# Patient Record
Sex: FEMALE | Race: WHITE
[De-identification: names, ages, dates, MRNs, and addresses within clinical notes are randomized per-mention and may not be internally consistent; named-entity substitution may affect disease eponyms.]

---

## 2018-06-14 ENCOUNTER — HOSPITAL ENCOUNTER (OUTPATIENT)
Dept: HOSPITAL 89 - AMB | Age: 73
End: 2018-06-14
Payer: MEDICARE

## 2018-06-14 ENCOUNTER — HOSPITAL ENCOUNTER (INPATIENT)
Dept: HOSPITAL 89 - ER | Age: 73
LOS: 3 days | Discharge: HOME | DRG: 872 | End: 2018-06-17
Attending: UROLOGY | Admitting: UROLOGY
Payer: MEDICARE

## 2018-06-14 VITALS — SYSTOLIC BLOOD PRESSURE: 162 MMHG | DIASTOLIC BLOOD PRESSURE: 78 MMHG

## 2018-06-14 VITALS — SYSTOLIC BLOOD PRESSURE: 161 MMHG | DIASTOLIC BLOOD PRESSURE: 92 MMHG

## 2018-06-14 VITALS — SYSTOLIC BLOOD PRESSURE: 148 MMHG | DIASTOLIC BLOOD PRESSURE: 80 MMHG

## 2018-06-14 VITALS — SYSTOLIC BLOOD PRESSURE: 157 MMHG | DIASTOLIC BLOOD PRESSURE: 84 MMHG

## 2018-06-14 VITALS — DIASTOLIC BLOOD PRESSURE: 89 MMHG | SYSTOLIC BLOOD PRESSURE: 159 MMHG

## 2018-06-14 VITALS — SYSTOLIC BLOOD PRESSURE: 151 MMHG | DIASTOLIC BLOOD PRESSURE: 83 MMHG

## 2018-06-14 VITALS — DIASTOLIC BLOOD PRESSURE: 96 MMHG | SYSTOLIC BLOOD PRESSURE: 163 MMHG

## 2018-06-14 VITALS — DIASTOLIC BLOOD PRESSURE: 100 MMHG | SYSTOLIC BLOOD PRESSURE: 173 MMHG

## 2018-06-14 VITALS — DIASTOLIC BLOOD PRESSURE: 72 MMHG | SYSTOLIC BLOOD PRESSURE: 138 MMHG

## 2018-06-14 VITALS — SYSTOLIC BLOOD PRESSURE: 156 MMHG | DIASTOLIC BLOOD PRESSURE: 85 MMHG

## 2018-06-14 VITALS — SYSTOLIC BLOOD PRESSURE: 157 MMHG | DIASTOLIC BLOOD PRESSURE: 95 MMHG

## 2018-06-14 VITALS — DIASTOLIC BLOOD PRESSURE: 87 MMHG | SYSTOLIC BLOOD PRESSURE: 162 MMHG

## 2018-06-14 DIAGNOSIS — N30.01: ICD-10-CM

## 2018-06-14 DIAGNOSIS — B96.4: ICD-10-CM

## 2018-06-14 DIAGNOSIS — N18.3: ICD-10-CM

## 2018-06-14 DIAGNOSIS — M25.559: ICD-10-CM

## 2018-06-14 DIAGNOSIS — F41.8: ICD-10-CM

## 2018-06-14 DIAGNOSIS — R10.84: Primary | ICD-10-CM

## 2018-06-14 DIAGNOSIS — A41.59: Primary | ICD-10-CM

## 2018-06-14 DIAGNOSIS — I12.9: ICD-10-CM
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
## 2018-06-14 DIAGNOSIS — N13.6: ICD-10-CM

## 2018-06-14 DIAGNOSIS — Z90.49: ICD-10-CM

## 2018-06-14 DIAGNOSIS — J45.909: ICD-10-CM

## 2018-06-14 LAB
PLATELET COUNT, AUTOMATED: 178 K/UL (ref 150–450)
PLATELET COUNT, AUTOMATED: 201 K/UL (ref 150–450)

## 2018-06-14 PROCEDURE — 87088 URINE BACTERIA CULTURE: CPT

## 2018-06-14 PROCEDURE — 84295 ASSAY OF SERUM SODIUM: CPT

## 2018-06-14 PROCEDURE — 84450 TRANSFERASE (AST) (SGOT): CPT

## 2018-06-14 PROCEDURE — 82565 ASSAY OF CREATININE: CPT

## 2018-06-14 PROCEDURE — 71045 X-RAY EXAM CHEST 1 VIEW: CPT

## 2018-06-14 PROCEDURE — 82040 ASSAY OF SERUM ALBUMIN: CPT

## 2018-06-14 PROCEDURE — 82247 BILIRUBIN TOTAL: CPT

## 2018-06-14 PROCEDURE — 84520 ASSAY OF UREA NITROGEN: CPT

## 2018-06-14 PROCEDURE — 84132 ASSAY OF SERUM POTASSIUM: CPT

## 2018-06-14 PROCEDURE — 3E1K88Z IRRIGATION OF GENITOURINARY TRACT USING IRRIGATING SUBSTANCE, VIA NATURAL OR ARTIFICIAL OPENING ENDOSCOPIC: ICD-10-PCS | Performed by: UROLOGY

## 2018-06-14 PROCEDURE — 82310 ASSAY OF CALCIUM: CPT

## 2018-06-14 PROCEDURE — 87186 SC STD MICRODIL/AGAR DIL: CPT

## 2018-06-14 PROCEDURE — 87040 BLOOD CULTURE FOR BACTERIA: CPT

## 2018-06-14 PROCEDURE — 36415 COLL VENOUS BLD VENIPUNCTURE: CPT

## 2018-06-14 PROCEDURE — 87077 CULTURE AEROBIC IDENTIFY: CPT

## 2018-06-14 PROCEDURE — 96367 TX/PROPH/DG ADDL SEQ IV INF: CPT

## 2018-06-14 PROCEDURE — 0T768DZ DILATION OF RIGHT URETER WITH INTRALUMINAL DEVICE, VIA NATURAL OR ARTIFICIAL OPENING ENDOSCOPIC: ICD-10-PCS | Performed by: UROLOGY

## 2018-06-14 PROCEDURE — BT1DZZZ FLUOROSCOPY OF RIGHT KIDNEY, URETER AND BLADDER: ICD-10-PCS | Performed by: UROLOGY

## 2018-06-14 PROCEDURE — 74420 UROGRAPHY RTRGR +-KUB: CPT

## 2018-06-14 PROCEDURE — 96365 THER/PROPH/DIAG IV INF INIT: CPT

## 2018-06-14 PROCEDURE — 99285 EMERGENCY DEPT VISIT HI MDM: CPT

## 2018-06-14 PROCEDURE — 74176 CT ABD & PELVIS W/O CONTRAST: CPT

## 2018-06-14 PROCEDURE — 82374 ASSAY BLOOD CARBON DIOXIDE: CPT

## 2018-06-14 PROCEDURE — 83605 ASSAY OF LACTIC ACID: CPT

## 2018-06-14 PROCEDURE — 96361 HYDRATE IV INFUSION ADD-ON: CPT

## 2018-06-14 PROCEDURE — 84075 ASSAY ALKALINE PHOSPHATASE: CPT

## 2018-06-14 PROCEDURE — 85025 COMPLETE CBC W/AUTO DIFF WBC: CPT

## 2018-06-14 PROCEDURE — 84155 ASSAY OF PROTEIN SERUM: CPT

## 2018-06-14 PROCEDURE — 84460 ALANINE AMINO (ALT) (SGPT): CPT

## 2018-06-14 PROCEDURE — 81001 URINALYSIS AUTO W/SCOPE: CPT

## 2018-06-14 PROCEDURE — 82947 ASSAY GLUCOSE BLOOD QUANT: CPT

## 2018-06-14 PROCEDURE — 96375 TX/PRO/DX INJ NEW DRUG ADDON: CPT

## 2018-06-14 PROCEDURE — 93005 ELECTROCARDIOGRAM TRACING: CPT

## 2018-06-14 PROCEDURE — 83690 ASSAY OF LIPASE: CPT

## 2018-06-14 PROCEDURE — 82435 ASSAY OF BLOOD CHLORIDE: CPT

## 2018-06-14 PROCEDURE — 96368 THER/DIAG CONCURRENT INF: CPT

## 2018-06-14 RX ADMIN — SODIUM CHLORIDE SCH MLS/HR: 900 INJECTION, SOLUTION INTRAVENOUS at 19:42

## 2018-06-14 RX ADMIN — METOPROLOL TARTRATE SCH MG: 50 TABLET, FILM COATED ORAL at 21:42

## 2018-06-14 RX ADMIN — DOCUSATE SODIUM SCH MG: 100 CAPSULE, LIQUID FILLED ORAL at 21:41

## 2018-06-14 NOTE — RADIOLOGY IMAGING REPORT
FACILITY: [facility] 

 

PATIENT NAME: Mila Maria

: 1945

MR: [id number]

V: [id number]

EXAM DATE: 

ORDERING PHYSICIAN: WILLIAM MELLO

TECHNOLOGIST: 

 

Location: [facility] [place]

Patient: Mila Maria

: 1945

MRN: [id number]

Visit/Account:[id number]

Date of Sevice:  2018

 

ACCESSION #: [id number]

 

Right knee

 

Indication: Pain

 

Comparison: None available

 

Findings:

3 views right knee were obtained.

Degenerative narrowing seen in the medial compartment with mild osteophytic change. There is severe n
arrowing in the lateral compartment with moderate marginal osteophytic change. Osseous spurring is se
en along the intercondylar notch and involving the tibial spines. Lateral view demonstrates narrowing
 and spurring the patellofemoral joint. Enthesopathy is seen at the insertion the quadriceps tendon o
n the superior patella.

No joint effusion. No acute fracture.

 

 

IMPRESSION:

1. Moderate to severe tricompartmental degenerative change. No acute finding.

 

Report Dictated By: Boone Barr MD at 2018 7:34 AM

 

Report E-Signed By: Boone Barr MD  at 2018 7:35 AM

 

WSN:[id number]

## 2018-06-14 NOTE — RADIOLOGY IMAGING REPORT
FACILITY: [facility] 

 

PATIENT NAME: Mila Maria

: 1945

MR: [id number]

V: [id number]

EXAM DATE: 

ORDERING PHYSICIAN: KANIKA VARGHESE

TECHNOLOGIST: 

 

Location: [facility]

Patient: Mila Maria

: 1945

MRN: [id number]

Visit/Account:[id number]

Date of Sevice:  2018

 

ACCESSION #: [id number]

 

Exam type: RETROGRADE PYELOGRAM

 

History: POSSIBLE KIDNEY STONES

 

Comparison: CT abdomen and pelvis performed today.

 

Findings:

 

16 intraoperative C-arm spot views of the abdomen and pelvis were submitted for interpretation.  The 
total fluoroscopy time was 0.53 minutes.  The fluoroscopy dose was 1.02 mGray.  Contrast is identifie
d in the moderately dilated right renal pelvis.  Final images demonstrate a right ureteral stent in p
lace.  Partial staghorn calculus noted on today's CT scan is seen projecting over the left renal shad
ow.

 

IMPRESSION:

 

1.  As above

 

Report Dictated By: Hedy Anderson MD at 2018 1:38 PM

 

Report E-Signed By: Hedy Anderson MD  at 2018 1:40 PM

 

WSN:[id number]

## 2018-06-14 NOTE — RADIOLOGY IMAGING REPORT
FACILITY: [facility] 

 

PATIENT NAME: Mila Maria

: 1945

MR: [id number]

V: [id number]

EXAM DATE: 

ORDERING PHYSICIAN: WILLIAM MELLO

TECHNOLOGIST: 

 

[place] [facility] [place]

Patient: Mila Maria

: 1945

MRN: [id number]

Visit/Account:[id number]

Date of Sevice:  2018

 

ACCESSION #: [id number]

 

CT abdomen and pelvis without contrast

 

Indication: Flank pain, fever, nausea

 

Comparison: CT examination of the abdomen from 2011

 

Technique: Axial CT images are obtained through the abdomen and pelvis. Reformatted coronal and sagit
gee images were reviewed. IV contrast was not administered. One of the following dose optimization te
chniques was utilized in the performance of this exam: Automated exposure control; adjustment of the 
mA and/or kV according to the patient's size; or use of an iterative  reconstruction technique.  Spec
[facility] details can be referenced in the facility's radiology CT exam operational policy.

 

Findings:

Lower lung fields: Linear type atelectasis/scarring is noted in the lingula and dependently within th
e lung bases.

 

 

Evaluation of the solid organs of the abdomen is limited without IV contrast.

 

Liver: No focal parenchymal abnormality of the liver.

Biliary: Gallbladder is been surgically removed.

Pancreas: No acute finding

Spleen: Normal appearance.

Adrenal glands: Nodularity is noted to the body of the right adrenal gland inferiorly without discret
e nodule.

Kidneys / retroperitoneum: There is large, nonobstructing calcification involving upper pole calyceal
 system on the left. The stone measures 3.1 x 1.7 cm in dimension.

On the right, there is a 0.8 x 0.6 x 2.7 cm linear stone which is obstructing the proximal right uret
er there is subsequent moderate right-sided hydronephrosis with right-sided perinephric stranding. No
 additional stones in the ureters or urinary bladder.

 

 

Bowel / peritoneum / mesenteries: Large and small intestine are without acute pathology.

 

 

Lymph node assessment: No pathologic adenopathy identified.

 

 

Vessels: Mild atherosclerotic calcifications seen throughout a nonaneurysmal abdominal aorta and bran
ches.

 

Musculoskeletal / Body wall: Fat-containing periumbilical hernia. Moderate to severe spondylotic montes
ges lower lumbar spine. No acute osseous finding.

 

 

 

IMPRESSION:

1. There is a 2.7 x 0.8 x 0.6 cm stone in the right UPJ with subsequent moderate right-sided hydronep
hrosis and perinephric stranding. Large nonobstructive left-sided calyceal stone.

2. Other incidental findings as above.

 

 

Report Dictated By: Boone Barr MD at 2018 7:36 AM

 

Report E-Signed By: Boone Barr MD  at 2018 7:42 AM

 

WSN:[id number]

## 2018-06-14 NOTE — RADIOLOGY IMAGING REPORT
[place] [facility] [place] 

 

PATIENT NAME: Mila Maria

: 1945

MR: [id number]

V: [id number]

EXAM DATE: 

ORDERING PHYSICIAN: WILLIAM MELLO

TECHNOLOGIST: 

 

Location: [facility] [place]

Patient: Mila Maria

: 1945

MRN: [id number]

Visit/Account:[id number]

Date of Sevice:  2018

 

ACCESSION #: [id number]

 

Single view of the chest

 

Indication: Fever.

 

Comparison: X-ray examination of the chest from 2016.

 

Findings:

Heart is enlarged. Linear scarring/atelectasis is noted in the left midlung, stable. Stable mild cent
ral bronchitic changes. No evidence of new infiltrate or consolidation. No effusion or pneumothorax. 
No acute bony finding

 

 

IMPRESSION:

1. Cardiomegaly without acute finding.

 

Report Dictated By: Boone Barr MD at 2018 7:35 AM

 

Report E-Signed By: Boone Barr MD  at 2018 7:36 AM

 

WSN:[id number]

## 2018-06-14 NOTE — ER REPORT
History and Physical


Time Seen By MD:  04:53


Hx. of Stated Complaint:  


pt reports pain last few weeks in R hip and groin, nausea


 (WILLIAM MELLO MD)


HPI/ROS


72-year-old female with a history of multiple UTIs in the past. She called the 

paramedics early this morning complaining of right hip pain for 2 months which 

has worsened for the past 2 weeks. However upon arrival when asked why she 

presented to the hospital with pain after 2 months in the middle the night she 

started to say that she had abdominal pain that started at 5 PM yesterday. The 

paramedics state that she never said anything about abdominal pain. She denies 

dysuria, urgency or frequency. No fever chills. She denies any falls or other 

trauma. Chest pain and no shortness of breath. She states that her hip pain is 

better but she has right sided abdominal pain and right flank pain. Paramedics 

said when they arrived at the home she was able to walk briskly with a walker 

to get to the ambulance. She does complain of nausea.


Remainder of the 14 system rev:  Yes


 (WILLIAM MELLO MD)


Allergies:  


Coded Allergies:  


     No Known Drug Allergies (Verified , 11)


Home Meds


Reported Medications


Metoprolol Tartrate (Metoprolol Tartrate) 100 Mg Tablet, 100 MG PO BID, #60 0 

Refills


   11


Oregano Oil (Oil Of Oregano) 1,500 Mg Capsule, 1500 MG PO, 0 Refills


   11


Saline (Ocean Nasal Spray) 45 Ml Spray, 0 NA PRN, 0 Refills


   SPRAY


   11


Discontinued Reported Medications


Levofloxacin (Levaquin) 500 Mg Tab, 500 MG PO QDAY for 4 Days, 0 Refills


   11


Acetaminophen/Hydrocodone (Lortab 5/325 Mg) 5 Mg/325 Mg Tab, 1-2 TAB PO Q4H, #

30 0 Refills


   as needed for pain


   11


Ketorolac Tromethamine (Toradol) 10 Mg Tab, 10 MG PO QID, #12 0 Refills


   11


Lisinopril (Lisinopril) 40 Mg Tablet, 40 MG PO DAILY, 0 Refills


   11


Cetirizine Hcl (Zyrtec) 10 Mg Tab, 10 MG PO QDAY, 0 Refills


   11


Discontinued Scripts


Lorazepam (ATIVAN) 0.5 Mg Tablet, 0.5 MG PO Q4-6H Y for ANXIETY, #15 TAB 0 

Refills


   Prov:JAVY MARY MD         16


Amoxicillin/Pot Clav 875-125 Mg Tab (AUGMENTIN 875-125 TABLET) 1 Each Tablet, 1 

TAB PO Q12H, #20 TAB 0 Refills


   Prov:JAVY MARY MD         16


Reviewed Nurses Notes:  Yes


Old Medical Records Reviewed:  Yes


 (WILLIAM MELLO MD)


Hx Smoking:  No


Hx Substance Use Disorder:  No


Hx Alcohol Use:  No


 (WILLIAM MELLO MD)


Constitutional





Vital Sign - Last 24 Hours








 18





 04:34 04:35 04:39 04:49


 


Temp  98.3  


 


Pulse ??? 87  83


 


Resp  18  


 


B/P (MAP)  211/142 211/142 (165) 


 


Pulse Ox  93  92


 


O2 Delivery  Room Air  


 


    





 18





 04:52 05:00 05:04 05:19


 


Pulse   83 86


 


B/P (MAP) 200/120 (146) 209/123 (151)  


 


Pulse Ox   92 92





 18





 05:30 05:49 05:57 06:01


 


Temp    100.5


 


Pulse  87  


 


B/P (MAP) 223/142 (169)  219/112 (147) 


 


Pulse Ox  92  


 


    





 18





 06:17 06:30 06:32 07:17


 


Pulse 90  89 90


 


Resp    26


 


B/P (MAP)  213/125 (154)  


 


Pulse Ox 90  87 91





 18





 07:22 07:30 07:32 07:37


 


Temp   99.0 


 


Pulse 93   95


 


Resp 15   61


 


B/P (MAP)  206/105 (138)  


 


Pulse Ox 90   90


 


    





 18   





 07:52   


 


Pulse 94   


 


Resp 10   


 


Pulse Ox 90   





 (QUINN ROBLERO MD)


Physical Exam


General Appearance: The patient is alert, has no immediate need for airway 

protection and no current signs of toxicity.  


Eyes: Pupils equal and round no injection.


Respiratory: Chest is non tender, lungs are clear to auscultation.


Cardiac: regular rate and rhythm 


Gastrointestinal: Abdomen is soft and non tender, no masses, bowel sounds 

normal.


Musculoskeletal:  Neck: Neck is supple and non tender.


Extremities have full range of motion and are non tender.


Skin: No rashes or lesions.





DIFFERENTIAL DIAGNOSIS: After history and physical exam differential diagnosis 

was considered for 


 (WILLIAM MELLO MD)





Medical Decision Making


Data Points


Result Diagram:  


18 0501                                                                   

             18 0501





Laboratory





Hematology








Test


  18


05:01 18


05:17 18


06:28


 


Red Blood Count


  5.07 M/uL


(4.17-5.56) 


  


 


 


Mean Corpuscular Volume


  88.7 fL


(80.0-96.0) 


  


 


 


Mean Corpuscular Hemoglobin


  30.3 pg


(26.0-33.0) 


  


 


 


Mean Corpuscular Hemoglobin


Concent 34.1 g/dL


(32.0-36.0) 


  


 


 


Red Cell Distribution Width


  15.4 %


(11.5-14.5) 


  


 


 


Mean Platelet Volume


  8.7 fL


(7.2-11.1) 


  


 


 


Neutrophils (%) (Auto)


  93.3 %


(39.4-72.5) 


  


 


 


Lymphocytes (%) (Auto)


  5.0 %


(17.6-49.6) 


  


 


 


Monocytes (%) (Auto)


  1.2 %


(4.1-12.4) 


  


 


 


Eosinophils (%) (Auto)


  0.0 %


(0.4-6.7) 


  


 


 


Basophils (%) (Auto)


  0.5 %


(0.3-1.4) 


  


 


 


Nucleated RBC Relative Count


(auto) 0.2 /100WBC 


  


  


 


 


Neutrophils # (Auto)


  9.6 K/uL


(2.0-7.4) 


  


 


 


Lymphocytes # (Auto)


  0.5 K/uL


(1.3-3.6) 


  


 


 


Monocytes # (Auto)


  0.1 K/uL


(0.3-1.0) 


  


 


 


Eosinophils # (Auto)


  0.0 K/uL


(0.0-0.5) 


  


 


 


Basophils # (Auto)


  0.0 K/uL


(0.0-0.1) 


  


 


 


Nucleated RBC Absolute Count


(auto) 0.02 K/uL 


  


  


 


 


Sodium Level


  135 mmol/L


(137-145) 


  


 


 


Potassium Level


  4.3 mmol/L


(3.5-5.0) 


  


 


 


Chloride Level


  102 mmol/L


() 


  


 


 


Carbon Dioxide Level


  21 mmol/L


(22-31) 


  


 


 


Blood Urea Nitrogen


  18 mg/dl


(7-18) 


  


 


 


Creatinine


  1.60 mg/dl


(0.52-1.04) 


  


 


 


Glomerular Filtration Rate


Calc 31.8 


  


  


 


 


Random Glucose


  163 mg/dl


() 


  


 


 


Calcium Level


  9.7 mg/dl


(8.4-10.2) 


  


 


 


Total Bilirubin


  0.9 mg/dl


(0.2-1.3) 


  


 


 


Aspartate Amino Transf


(AST/SGOT) 29 U/L (0-35) 


  


  


 


 


Alanine Aminotransferase


(ALT/SGPT) 38 U/L (0-56) 


  


  


 


 


Alkaline Phosphatase


  121 U/L


(0-126) 


  


 


 


Total Protein


  7.6 g/dl


(6.3-8.2) 


  


 


 


Albumin


  3.7 g/dl


(3.5-5.0) 


  


 


 


Lipase


  71 U/L


() 


  


 


 


Urine Color  Yellow  


 


Urine Clarity


  


  Slightly-cloudy


  


 


 


Urine pH


  


  7.0 pH


(4.8-9.5) 


 


 


Urine Specific Gravity  1.009  


 


Urine Protein


  


  30 mg/dL


(NEGATIVE) 


 


 


Urine Glucose (UA)


  


  Negative mg/dL


(NEGATIVE) 


 


 


Urine Ketones


  


  Negative mg/dL


(NEGATIVE) 


 


 


Urine Blood


  


  Small


(NEGATIVE) 


 


 


Urine Nitrite


  


  Negative


(NEGATIVE) 


 


 


Urine Bilirubin


  


  Negative


(NEGATIVE) 


 


 


Urine Urobilinogen


  


  Negative mg/dL


(0.2-1.9) 


 


 


Urine Leukocyte Esterase


  


  Large


(NEGATIVE) 


 


 


Urine RBC


  


  16 /HPF


(0-2/HPF) 


 


 


Urine WBC


  


  53 /HPF


(0-5/HPF) 


 


 


Urine WBC Clumps  Few /HPF  


 


Urine Squamous Epithelial


Cells 


  Moderate /LPF


(NONE-FEW) 


 


 


Urine Transitional Epithelial


Cells 


  Few /LPF


(NONE-FEW) 


 


 


Urine Bacteria


  


  Few /HPF


(NONE-FEW) 


 


 


Urine Mucus


  


  None /HPF


(NONE-FEW) 


 


 


Lactate


  


  


  2.3 mmol/L


(0.7-2.1)








Chemistry








Test


  18


05:01 18


05:17 18


06:28


 


White Blood Count


  10.2 k/uL


(4.5-11.0) 


  


 


 


Red Blood Count


  5.07 M/uL


(4.17-5.56) 


  


 


 


Hemoglobin


  15.3 g/dL


(12.0-16.0) 


  


 


 


Hematocrit


  45.0 %


(34.0-47.0) 


  


 


 


Mean Corpuscular Volume


  88.7 fL


(80.0-96.0) 


  


 


 


Mean Corpuscular Hemoglobin


  30.3 pg


(26.0-33.0) 


  


 


 


Mean Corpuscular Hemoglobin


Concent 34.1 g/dL


(32.0-36.0) 


  


 


 


Red Cell Distribution Width


  15.4 %


(11.5-14.5) 


  


 


 


Platelet Count


  201 K/uL


(150-450) 


  


 


 


Mean Platelet Volume


  8.7 fL


(7.2-11.1) 


  


 


 


Neutrophils (%) (Auto)


  93.3 %


(39.4-72.5) 


  


 


 


Lymphocytes (%) (Auto)


  5.0 %


(17.6-49.6) 


  


 


 


Monocytes (%) (Auto)


  1.2 %


(4.1-12.4) 


  


 


 


Eosinophils (%) (Auto)


  0.0 %


(0.4-6.7) 


  


 


 


Basophils (%) (Auto)


  0.5 %


(0.3-1.4) 


  


 


 


Nucleated RBC Relative Count


(auto) 0.2 /100WBC 


  


  


 


 


Neutrophils # (Auto)


  9.6 K/uL


(2.0-7.4) 


  


 


 


Lymphocytes # (Auto)


  0.5 K/uL


(1.3-3.6) 


  


 


 


Monocytes # (Auto)


  0.1 K/uL


(0.3-1.0) 


  


 


 


Eosinophils # (Auto)


  0.0 K/uL


(0.0-0.5) 


  


 


 


Basophils # (Auto)


  0.0 K/uL


(0.0-0.1) 


  


 


 


Nucleated RBC Absolute Count


(auto) 0.02 K/uL 


  


  


 


 


Glomerular Filtration Rate


Calc 31.8 


  


  


 


 


Calcium Level


  9.7 mg/dl


(8.4-10.2) 


  


 


 


Total Bilirubin


  0.9 mg/dl


(0.2-1.3) 


  


 


 


Aspartate Amino Transf


(AST/SGOT) 29 U/L (0-35) 


  


  


 


 


Alanine Aminotransferase


(ALT/SGPT) 38 U/L (0-56) 


  


  


 


 


Alkaline Phosphatase


  121 U/L


(0-126) 


  


 


 


Total Protein


  7.6 g/dl


(6.3-8.2) 


  


 


 


Albumin


  3.7 g/dl


(3.5-5.0) 


  


 


 


Lipase


  71 U/L


() 


  


 


 


Urine Color  Yellow  


 


Urine Clarity


  


  Slightly-cloudy


  


 


 


Urine pH


  


  7.0 pH


(4.8-9.5) 


 


 


Urine Specific Gravity  1.009  


 


Urine Protein


  


  30 mg/dL


(NEGATIVE) 


 


 


Urine Glucose (UA)


  


  Negative mg/dL


(NEGATIVE) 


 


 


Urine Ketones


  


  Negative mg/dL


(NEGATIVE) 


 


 


Urine Blood


  


  Small


(NEGATIVE) 


 


 


Urine Nitrite


  


  Negative


(NEGATIVE) 


 


 


Urine Bilirubin


  


  Negative


(NEGATIVE) 


 


 


Urine Urobilinogen


  


  Negative mg/dL


(0.2-1.9) 


 


 


Urine Leukocyte Esterase


  


  Large


(NEGATIVE) 


 


 


Urine RBC


  


  16 /HPF


(0-2/HPF) 


 


 


Urine WBC


  


  53 /HPF


(0-5/HPF) 


 


 


Urine WBC Clumps  Few /HPF  


 


Urine Squamous Epithelial


Cells 


  Moderate /LPF


(NONE-FEW) 


 


 


Urine Transitional Epithelial


Cells 


  Few /LPF


(NONE-FEW) 


 


 


Urine Bacteria


  


  Few /HPF


(NONE-FEW) 


 


 


Urine Mucus


  


  None /HPF


(NONE-FEW) 


 


 


Lactate


  


  


  2.3 mmol/L


(0.7-2.1)








Urinalysis








Test


  18


05:17


 


Urine Color Yellow 


 


Urine Clarity


  Slightly-cloudy


 


 


Urine pH


  7.0 pH


(4.8-9.5)


 


Urine Specific Gravity 1.009 


 


Urine Protein


  30 mg/dL


(NEGATIVE)


 


Urine Glucose (UA)


  Negative mg/dL


(NEGATIVE)


 


Urine Ketones


  Negative mg/dL


(NEGATIVE)


 


Urine Blood


  Small


(NEGATIVE)


 


Urine Nitrite


  Negative


(NEGATIVE)


 


Urine Bilirubin


  Negative


(NEGATIVE)


 


Urine Urobilinogen


  Negative mg/dL


(0.2-1.9)


 


Urine Leukocyte Esterase


  Large


(NEGATIVE)


 


Urine RBC


  16 /HPF


(0-2/HPF)


 


Urine WBC


  53 /HPF


(0-5/HPF)


 


Urine WBC Clumps Few /HPF 


 


Urine Squamous Epithelial


Cells Moderate /LPF


(NONE-FEW)


 


Urine Transitional Epithelial


Cells Few /LPF


(NONE-FEW)


 


Urine Bacteria


  Few /HPF


(NONE-FEW)


 


Urine Mucus


  None /HPF


(NONE-FEW)





 (QUINN ROBLERO MD)





EKG/Imaging


EKG Interpretation


EKG shows normal sinus rhythm with a ventricular rate of 94 bpm


Imaging


FACILITY: [facility] [place] 


 


PATIENT NAME: Mila Maria


: 1945


MR: [id number]


V: [id number]


EXAM DATE: 


ORDERING PHYSICIAN: WILLIAM MELLO


TECHNOLOGIST: 


 


Location: [facility] [place]


Patient: Mila Maria


: 1945


MRN: [id number]


Visit/Account:[id number]


Date of Sevice:  2018


 


ACCESSION #: [id number]


 


CT abdomen and pelvis without contrast


 


Indication: Flank pain, fever, nausea


 


Comparison: CT examination of the abdomen from 2011


 


Technique: Axial CT images are obtained through the abdomen and pelvis. 

Reformatted coronal and sagittal images were reviewed. IV contrast was not 

administered. One of the following dose optimization techniques was utilized in 

the performance of this exam: Automated exposure control; adjustment of the mA 

and/or kV according to the patient's size; or use of an iterative  

reconstruction technique.  Specific details can be referenced in the facility's 

radiology CT exam operational policy.


 


Findings:


Lower lung fields: Linear type atelectasis/scarring is noted in the lingula and 

dependently within the lung bases.


 


 


Evaluation of the solid organs of the abdomen is limited without IV contrast.


 


Liver: No focal parenchymal abnormality of the liver.


Biliary: Gallbladder is been surgically removed.


Pancreas: No acute finding


Spleen: Normal appearance.


Adrenal glands: Nodularity is noted to the body of the right adrenal gland 

inferiorly without discrete nodule.


Kidneys / retroperitoneum: There is large, nonobstructing calcification 

involving upper pole calyceal system on the left. The stone measures 3.1 x 1.7 

cm in dimension.


On the right, there is a 0.8 x 0.6 x 2.7 cm linear stone which is obstructing 

the proximal right ureter there is subsequent moderate right-sided 

hydronephrosis with right-sided perinephric stranding. No additional stones in 

the ureters or urinary bladder.


 


 


Bowel / peritoneum / mesenteries: Large and small intestine are without acute 

pathology.


 


 


Lymph node assessment: No pathologic adenopathy identified.


 


 


Vessels: Mild atherosclerotic calcifications seen throughout a nonaneurysmal 

abdominal aorta and branches.


 


Musculoskeletal / Body wall: Fat-containing periumbilical hernia. Moderate to 

severe spondylotic changes lower lumbar spine. No acute osseous finding.


 


 


 


IMPRESSION:


1. There is a 2.7 x 0.8 x 0.6 cm stone in the right UPJ with subsequent 

moderate right-sided hydronephrosis and perinephric stranding. Large 

nonobstructive left-sided calyceal stone.


2. Other incidental findings as above.


 


 


Report Dictated By: Boone Barr MD at 2018 7:36 AM


 


Report E-Signed By: Boone Barr MD  at 2018 7:42 AM


 


WSN:[id number]





FACILITY: [facility] [place] 


 


PATIENT NAME: Mila Maria


: 1945


MR: [id number]


V: [id number]


EXAM DATE: 


ORDERING PHYSICIAN: WILLIAM MELLO


TECHNOLOGIST: 


 


Location: [facility] [place]


Patient: Mila Maria


: 1945


MRN: [id number]


Visit/Account:[id number]


Date of Sevice:  2018


 


ACCESSION #: [id number]


 


Single view of the chest


 


Indication: Fever.


 


Comparison: X-ray examination of the chest from 2016.


 


Findings:


Heart is enlarged. Linear scarring/atelectasis is noted in the left midlung, 

stable. Stable mild central bronchitic changes. No evidence of new infiltrate 

or consolidation. No effusion or pneumothorax. No acute bony finding


 


 


IMPRESSION:


1. Cardiomegaly without acute finding.


 


Report Dictated By: Boone Barr MD at 2018 7:35 AM


 


Report E-Signed By: Boone Barr MD  at 2018 7:36 AM


 


WSN:[id number]





FACILITY: [facility] [place] 


 


PATIENT NAME: Mila Maria


: 1945


MR: [id number]


V: [id number]


EXAM DATE: 


ORDERING PHYSICIAN: WILLIAM MELLO


TECHNOLOGIST: 


 


Location: [facility] [place]


Patient: Mila Maria


: 1945


MRN: [id number]


Visit/Account:[id number]


Date of Sevice:  2018


 


ACCESSION #: [id number]


 


Right knee


 


Indication: Pain


 


Comparison: None available


 


Findings:


3 views right knee were obtained.


Degenerative narrowing seen in the medial compartment with mild osteophytic 

change. There is severe narrowing in the lateral compartment with moderate 

marginal osteophytic change. Osseous spurring is seen along the intercondylar 

notch and involving the tibial spines. Lateral view demonstrates narrowing and 

spurring the patellofemoral joint. Enthesopathy is seen at the insertion the 

quadriceps tendon on the superior patella.


No joint effusion. No acute fracture.


 


 


IMPRESSION:


1. Moderate to severe tricompartmental degenerative change. No acute finding.


 


Report Dictated By: Boone Barr MD at 2018 7:34 AM


 


Report E-Signed By: Boone Barr MD  at 2018 7:35 AM


 


WSN:[id number]





FACILITY: [facility] [place] 


 


PATIENT NAME: Mila Maria


: 1945


MR: [id number]


V: [id number]


EXAM DATE: 


ORDERING PHYSICIAN: WILLIAM MELLO


TECHNOLOGIST: 


 


Location: [facility] [place]


Patient: Mila Maria


: 1945


MRN: [id number]


Visit/Account:[id number]


Date of Sevice:  2018


 


ACCESSION #: [id number]


 


HIP RIGHT


 


HISTORY: Pain for 2 months. No trauma.


 


COMPARISON: CTs of the abdomen and pelvis 2011 and 2011. Prior KUB .


 


TECHNIQUE: AP view of the pelvis and frog-leg lateral view of the right hip.


 


FINDINGS: There is no fracture or dislocation. Sacroiliac joints are patent 

without widening. There is no pubic diastases. There is a left pelvic 

phlebolith.


 


IMPRESSION:


1. No acute osseous abnormality of the right hip.


 


Report Dictated By: Pamela Ariza at 2018 6:11 AM


 


Report E-Signed By: Pamela Ariza  at 2018 6:13 AM


 


WSN:[id number]





 (QUINN ROBLERO MD)





ED Course/Re-evaluation


ED Course


 2018 7:33:54 am I accepted care of patient at 7 AM this morning. Briefly 

this is a 72-year-old female who is brought in by ambulance around 4 AM for 

abdominal pain. Evaluation emergency department showed evidence for possible 

urinary tract infection culture sent. Patient did spike a fever in the 

emergency department. Slightly elevated lactate and creatinine. Patient 

currently receiving IV fluids patient did get urine and blood cultures drawn. 

CT scan of the abdomen and pelvis is pending at this time.


Decision to Disposition Date:  2018


Decision to Disposition Time:  08:06


 (QUINN ROBLERO MD)





Depart


Departure


Latest Vital Signs





Vital Signs








  Date Time  Temp Pulse Resp B/P (MAP) Pulse Ox O2 Delivery O2 Flow Rate FiO2


 


18 07:52  94 10  90   


 


18 07:32 99.0       


 


18 07:30    206/105 (138)    


 


18 04:35      Room Air  





 (QUINN ROBLERO MD)


Impression:  


 Primary Impression:  


 Hydronephrosis with ureteropelvic junction (UPJ) obstruction


 Additional Impression:  


 Urinary tract infection


Condition:  Improved


Disposition:  ADMIT FROM ER TO OR (to Dr Rodrigues)


Referrals:  


HIEU FERNANDO DO (PCP)





Problem Qualifiers








 Additional Impression:  


 Urinary tract infection


 Urinary tract infection type:  acute cystitis  Hematuria presence:  with 

hematuria  Qualified Codes:  N30.01 - Acute cystitis with hematuria








WILLIAM MELLO MD 2018 05:01


QUINN ROBLERO MD 2018 07:35

## 2018-06-14 NOTE — HISTORY AND PHYSICAL
DATE OF ADMISSION:  June 14, 2018





CHIEF COMPLAINT  

Right proximal ureteral calculi with colic and probable infection.



HISTORY OF PRESENT ILLNESS 

The patient is a 72-year-old white female with a history of kidney stones and 
urinary tract infections who presented to the emergency room in the morning 
hours of June 14 with right sided abdominal and flank pain.  She was evaluated 
by the emergency room staff, who obtained a CT scan, which revealed a linear 
calcification approximately 2 mm long and 8 mm wide into the right UPJ with 
moderate hydronephrosis.  She was also noted to have a 3 cm left upper pole 
stone.  The patient was noted to have a low grade fever in the emergency room.  
Her white count showed normal white count but having left shift and 93 
neutrophils.  Her urine showed 53 WBCs per high powered field with a few 
bacteria.  Her lactate was elevated at 2.3 and creatinine was elevated to 1.6, 
up from baseline of 1.1.  Blood and urine cultures have been obtained.  She was 
given broad spectrum antibiotics with Keflex and Rocephin in the emergency room 
before I was consulted.  She is now being admitted to be taken to the operating 
room for right ureteral stent placement for decompression and drainage of her 
right system with possible ureteroscopy as indicated. 



PAST MEDICAL HISTORY 

* Hypertension.

* Obesity.

* Anxiety and depression.

* Asthma.

* Urinary incontinence.

* Urinary tract infections, last one documented in September 2016 with E. coli.

* Pancreatitis secondary to gallstones.

* Kidney stone disease since 1980s.



PAST SURGICAL HISTORY 

* Right JJ stent placement for obstructing kidney stone with Proteus infection 
in April 2011.

* Right ureteroscopy and extracorporeal shockwave lithotripsy in May 2011.

* Laparoscopic cholecystectomy in December 2011.



ALLERGIES 

No known drug allergies.



CURRENT MEDICATIONS 

* Metoprolol.

* Oregano Oil.

* Sodium chloride nasal spray.



SOCIAL HISTORY 

Patient is a  and lives in [place], [place]. 



REVIEW OF SYSTEMS 

Patient denies current chest pain, shortness of breath, bleeding disorder, 
gross hematuria, headaches, liver disease or productive cough. She does report 
positive nausea over the past several days.



PHYSICAL EXAMINATION 

Patient is a mildly obese white female in no acute distress.  HEENT:  
Normocephalic/atraumatic.  Chest:  Clear to auscultation bilaterally.  
Cardiovascular:  Regular rate and rhythm.. Abdomen:  Soft, nontender, slightly 
obese.  :  Deferred to OR.  Extremities:  Without clubbing, cyanosis or 
edema. Neurological:  Nonfocal.



ASSESSMENT 

* 72-year-old white female with a history of Proteus infection with kidney 
stones, now with bilateral stones.  She has a 2.7 x 0.8 x 0.6 curvilinear stone 
at the right uteropelvic junction (UPJ) with subsequent moderate 
hydronephrosis.  She is also noted to have a 3 x 1.7 cm left upper pole 
calyceal stone.  Clinically, she appears to have an early urinary tract 
infection with her elevated lactate, urinalysis and left shift on her CBC.  
These findings were discussed with the patient.  



PLAN 

Perform anesthetic cystoscopy, right ureteral stent placement and possible 
ureteroscopy. If we are unable to place a stent from below, she will need 
urgent right percutaneous nephrostomy tube placement for decompression of her 
upper tract.  After her urinary tract infection has been cleared, we will 
proceed with definitive stone treatment.



KACIE

## 2018-06-14 NOTE — HOSPITALIST CONSULTATION
History of Present Illness


Requesting Physician


Ravi


Reason for Consult


hypertension, pyelonephritis


History of Present Illness


73yo female with hypertension who was admitted for a obstructing ureteral stone 

and pyelonephritis.  She presented with right sided abdominal pain that 

radiated to the right flank/back.  The pain started at 5pm yesterday.  It was 

constant, 10/10 and dull.  She had nausea yesterday and one episode of vomiting 

in the ER.  She denies any coffee ground substance in the vomitus or blood.  No 

diarrhea or chills, but she spiked a fever in the ER.  She was found to have an 

obstructing ureteral stone on the right, so went to the OR and had a stent 

placed.  Dr. Varghese reported that purulent material came out of the stent.  

Currently, she denies any abdominal pain, cp or sob.





History


Problems:  


(1) HTN (hypertension)


Status:  Chronic


(2) History of cholecystectomy


(3) History of kidney stones


Home Meds


Reported Medications


Metoprolol Tartrate (Metoprolol Tartrate) 100 Mg Tablet, 100 MG PO BID, #60 0 

Refills


   5/18/11


Oregano Oil (Oil Of Oregano) 1,500 Mg Capsule, 1500 MG PO, 0 Refills


   5/18/11


Saline (Ocean Nasal Spray) 45 Ml Spray, 0 NA PRN, 0 Refills


   SPRAY


   5/18/11


Discontinued Reported Medications


Levofloxacin (Levaquin) 500 Mg Tab, 500 MG PO QDAY for 4 Days, 0 Refills


   12/27/11


Acetaminophen/Hydrocodone (Lortab 5/325 Mg) 5 Mg/325 Mg Tab, 1-2 TAB PO Q4H, #

30 0 Refills


   as needed for pain


   12/27/11


Ketorolac Tromethamine (Toradol) 10 Mg Tab, 10 MG PO QID, #12 0 Refills


   12/27/11


Lisinopril (Lisinopril) 40 Mg Tablet, 40 MG PO DAILY, 0 Refills


   12/24/11


Cetirizine Hcl (Zyrtec) 10 Mg Tab, 10 MG PO QDAY, 0 Refills


   5/18/11


Discontinued Scripts


Lorazepam (ATIVAN) 0.5 Mg Tablet, 0.5 MG PO Q4-6H Y for ANXIETY, #15 TAB 0 

Refills


   Prov:JAVY MARY MD         9/13/16


Amoxicillin/Pot Clav 875-125 Mg Tab (AUGMENTIN 875-125 TABLET) 1 Each Tablet, 1 

TAB PO Q12H, #20 TAB 0 Refills


   Prov:JAVY MARY MD         9/13/16


Allergies:  


Coded Allergies:  


     No Known Drug Allergies (Verified , 12/24/11)


Hx Smoking:  No


Caffeine Intake:  Coffee


Caffeine/Cups Per Day:  2


Hx Alcohol Use:  No


Hx Substance Use Disorder:  No





Review of Systems


All Systems Reviewed/Normal:  Yes, Except as Noted





Exam


Vital Signs





Vital Signs








  Date Time  Temp Pulse Resp B/P (MAP) Pulse Ox O2 Delivery O2 Flow Rate FiO2


 


6/14/18 13:44     90 Nasal Cannula 1.5 


 


6/14/18 13:35 98.2 90 16 173/100 (124)    








General Appearance:  Alert, Awake, No Acute Distress


Neuro:  Other (She is still a bit sleepy after surgery, so sometimes is slow 

with answers to questions.  She knows where she is and why she is here.  )


Eyes:  PERRLA


ENT:  Moist Mucous Membranes


Cardiovascular:  Regular Rate and Rhythm (2/6 sys murmur across precordium)


Respiratory:  Clear to Auscultation


GI:  Abd Soft and Non-Tender


:  No CVA Tenderness


Extremities:  Edema (Right ankle with 1+ pitting compared to none on the left (

reportedly fairly chronic))





Medical Decision Making


Data Points


Result Diagram:  


6/14/18 1254                                                                   

             6/14/18 1254














Item Value  Date Time


 


Urine Leukocyte Esterase Large H 6/14/18 0517


 


Urine RBC 16 /HPF 6/14/18 0517


 


Urine WBC 53 /HPF 6/14/18 0517


 


Urine Squamous Epithelial Cells Moderate /LPF H 6/14/18 0517


 


Urine WBC Clumps Few /HPF 6/14/18 0517


 


Creatinine 1.60 mg/dl H 6/14/18 0501


 


Creatinine 1.80 mg/dl H 6/14/18 1254


 


Lactate 2.3 mmol/L H 6/14/18 0628


 


Lactate 1.8 mmol/L 6/14/18 1044


 


Blood Urea Nitrogen 18 mg/dl 6/14/18 0501


 


Blood Urea Nitrogen 19 mg/dl H 6/14/18 1254


 


Carbon Dioxide Level 19 mmol/L L 6/14/18 1254


 


Carbon Dioxide Level 21 mmol/L L 6/14/18 0501


 


Neutrophils (%) (Auto) 93.3 % H 6/14/18 0501


 


Neutrophils (%) (Auto) 91.7 % H 6/14/18 1254


 


White Blood Count 10.2 k/uL 6/14/18 0501


 


White Blood Count 21.8 k/uL H 6/14/18 1254


 


Hemoglobin 15.3 g/dL 6/14/18 0501


 


Hemoglobin 13.3 g/dL 6/14/18 1254


 


Red Cell Distribution Width 15.4 % H 6/14/18 0501


 


Platelet Count 178 K/uL 6/14/18 1254


 


Platelet Count 201 K/uL 6/14/18 0501











EKG / Imaging


EKG Interpretation


Vent. Rate : 094 BPM     Atrial Rate : 094 BPM


   P-R Int : 154 ms          QRS Dur : 082 ms


    QT Int : 374 ms       P-R-T Axes : 048 029 005 degrees


   QTc Int : 467 ms


 


Normal sinus rhythm


Possible Left atrial enlargement


R wave progression consistent with old ant/sep MI


When compared with ECG of 13-SEP-2016 09:19,


premature ventricular complexes are no longer present


Confirmed by RIKA VEGA ([id number]) on 6/14/2018 11:33:25 AM


Imaging


CXR - 1. Cardiomegaly without acute finding.





Abd/Pelvis CT - 1. There is a 2.7 x 0.8 x 0.6 cm stone in the right UPJ with 

subsequent moderate right-sided hydronephrosis and perinephric stranding. Large 

nonobstructive left-sided calyceal stone.


2. Other incidental findings as above.





Knee Xray - 1. Moderate to severe tricompartmental degenerative change. No 

acute finding.





Hip Xray - 1. No acute osseous abnormality of the right hip.





Assessment and Plan


Problems:  


(1) Hydronephrosis with ureteropelvic junction (UPJ) obstruction


Status:  Acute


Assessment & Plan:  She presented with right abdominal pain and flank pain for 

about 12 hours prior to admission.  She had a stent placed today.  Lactate now 

normal.  BP still high.  P now wnl.  She was given ceftriaxone in the ER and 

Primaxin in the OR.  Previous urine cultures are sensitive to ceftriaxone, so 

will continue, but at 2g q24 hours.  Urine and blood cultures are pending.





(2) HTN (hypertension)


Status:  Chronic


Assessment & Plan:  Continue oral metoprolol and will use IV metoprolol for prn 

coverage.  She might need an additional medication for better control.





(3) CKD (chronic kidney disease) stage 3, GFR 30-59 ml/min


Status:  Chronic


Assessment & Plan:  Cr baseline appears to be around 1.1.  Currently, 1.8.  

Currently getting IVF and obstruction has been relieved.  Will follow.





Copies to:   HIEU FERNANDO DO; KANIKA VARGHESE MD





Venous Thromboembolism


Antithrombotics


Is Pt On Any Antithrombotics?:  No





Exam


Sepsis Risk:  No Definite Risk











RIKA VEGA MD Jun 14, 2018 15:43

## 2018-06-14 NOTE — OPERATIVE REPORT 1
EVENT DATE:  June 14, 2018

SURGEON:  Hua Rodrigues MD 

ANESTHESIOLOGIST:  Tom Lewis MD 

ANESTHESIA:  General anesthetic.





PREOPERATIVE DIAGNOSIS  

Right proximal ureteral calculus with urinary tract infection.



POSTOPERATIVE DIAGNOSIS 

Right proximal ureteral calculus with urinary tract infection.



PROCEDURES PERFORMED 

1.  Cystoscopy.

2.  Irrigation of bladder of amorphous debris.

3.  Right retrograde pyelogram.

4.  Right internal double-J ureteral stent placement.



ESTIMATED BLOOD LOSS 

Minimal.



INTRAVENOUS FLUIDS 

Crystalloid.



DRAINS

1.  A 6-Kazakh x 26 cm PercuFlex Plus stent on right.

2.  A 16-Kazakh Le catheter.



SPECIMENS 

Right upper tract urine culture via ureteral stent.  



COMPLICATIONS 

None.



CONDITION

Patient taken to recovery room awake and in stable condition.  



STATEMENT OF MEDICAL NECESSITY 

Patient is a 72-year-old white female with a history of kidney stones and 
urinary tract infection who now presented to the emergency room with right 
flank pain and was found to have an obstructing proximal ureteral calculus 
measuring 2.7 x 0.8 x 0.6 cm with proximal hydronephrosis.  She was also 
febrile with a left shift and an elevated lactate.  Urine and blood cultures 
were obtained.  She is now being brought to the operating room for planned 
ureteral stent placement for renal decompression.  



DESCRIPTION OF OPERATION PERFORMED 

Patient was brought to the operating room.  After general anesthetic was 
obtained, she was placed in the dorsal lithotomy position and prepped and 
draped in the usual sterile manner.  Anesthetic cystoscopy was performed with 
the 21-Kazakh rigid Mascorro scope and the 30-degree lens.  She had a normal-
appearing urethra.  Upon entering her bladder, she had diffusely erythematous 
patches consistent with acute cystitis as well as a large amount of a light 
yellowish, amorphous debris.  Her bladder was irrigated with the [facility] 
evacuator to remove all of the debris.  At this point, a right retrograde 
pyelogram was performed by using a 6-Kazakh open-ended access catheter and 
advancing it up the ureter in a retrograde manner.  It was advanced to 
approximately the mid ureter, and a retrograde pyelogram was performed.  Good 
filling of the proximal ureter and collecting system was obtained; however, the 
films were of poor quality secondary to poor penetration secondary to her body 
habitus.  There was no evidence of extravasation.  She had dilated renal 
calyces.  The stone could not be directly identified on the retrograde.  I 
advanced the stent up to the renal pelvis.  This was followed by a large amount 
of yellowish-white debris emitting from the ureteral orifice around the stent 
as well as from the 6-Kazakh lumen.  A sample of this material was collected 
and sent for culture.  Following this, a 0.035 wire was advanced in the lumen 
of the access catheter where it was curled in the upper pole calyx.  The access 
catheter was removed, and this wire was used to place a 6-Kazakh x 26 cm 
Contour stent.  The wire was removed.  She was noted to have good curling in 
the renal pelvis by fluoroscopy and good curling in the bladder by direct 
vision.  At this point, the scope was removed.  A 16-Kazakh Le catheter was 
placed with the balloon inflated to gravity drainage.  A B and O suppository 
was given per rectum at the conclusion of the case.  She was awakened in the 
operating room and taken to the recovery area in stable condition. 



PLAN

The plan will be to admit the patient for 23 hours of observation and start her 
on broad-spectrum antibiotics and intravenous fluid replacement.  Will have Dr. Nhan Muonz of the hospitalist service evaluate her for her other underlying 
medical conditions.  After she is appropriately treated for this infection, we 
plan definitive stone treatment in two to four weeks.  
KACIE

## 2018-06-14 NOTE — RADIOLOGY IMAGING REPORT
FACILITY: [facility] 

 

PATIENT NAME: Mila Maria

: 1945

MR: [id number]

V: [id number]

EXAM DATE: 

ORDERING PHYSICIAN: WILLIAM MLELO

TECHNOLOGIST: 

 

[place] [facility] [place]

Patient: Mila Maria

: 1945

MRN: [id number]

Visit/Account:[id number]

Date of Sevice:  2018

 

ACCESSION #: [id number]

 

HIP RIGHT

 

HISTORY: Pain for 2 months. No trauma.

 

COMPARISON: CTs of the abdomen and pelvis 2011 and 2011. Prior KUB 2011.

 

TECHNIQUE: AP view of the pelvis and frog-leg lateral view of the right hip.

 

FINDINGS: There is no fracture or dislocation. Sacroiliac joints are patent without widening. There i
s no pubic diastases. There is a left pelvic phlebolith.

 

IMPRESSION:

1. No acute osseous abnormality of the right hip.

 

Report Dictated By: Pamela Ariza at 2018 6:11 AM

 

Report E-Signed By: Pamela Ariza  at 2018 6:13 AM

 

WSN:[id number]

## 2018-06-15 VITALS — DIASTOLIC BLOOD PRESSURE: 102 MMHG | SYSTOLIC BLOOD PRESSURE: 162 MMHG

## 2018-06-15 VITALS — DIASTOLIC BLOOD PRESSURE: 77 MMHG | SYSTOLIC BLOOD PRESSURE: 130 MMHG

## 2018-06-15 VITALS — DIASTOLIC BLOOD PRESSURE: 97 MMHG | SYSTOLIC BLOOD PRESSURE: 165 MMHG

## 2018-06-15 VITALS — SYSTOLIC BLOOD PRESSURE: 163 MMHG | DIASTOLIC BLOOD PRESSURE: 92 MMHG

## 2018-06-15 LAB — PLATELET COUNT, AUTOMATED: 156 K/UL (ref 150–450)

## 2018-06-15 RX ADMIN — METOPROLOL TARTRATE SCH MG: 50 TABLET, FILM COATED ORAL at 08:42

## 2018-06-15 RX ADMIN — SODIUM CHLORIDE SCH MLS/HR: 900 INJECTION, SOLUTION INTRAVENOUS at 19:56

## 2018-06-15 RX ADMIN — NYSTATIN SCH GM: 100000 POWDER TOPICAL at 20:21

## 2018-06-15 RX ADMIN — METOPROLOL TARTRATE SCH MG: 50 TABLET, FILM COATED ORAL at 20:21

## 2018-06-15 RX ADMIN — DOCUSATE SODIUM SCH MG: 100 CAPSULE, LIQUID FILLED ORAL at 20:20

## 2018-06-15 RX ADMIN — DOCUSATE SODIUM SCH MG: 100 CAPSULE, LIQUID FILLED ORAL at 08:42

## 2018-06-15 NOTE — HOSPITALIST PROGRESS NOTE
Subjective


Progress Notes


Subjective


Mrs. Maria is a 72-year-old white female with PMH of HTN, Asthma, Obesity, 

Anxiety and Depression, Gall stone Pancreatitis and a history of recurrent 

kidney stones and urinary tract infections who presented to the emergency room 

in the morning hours of June 14/18 with right sided abdominal and flank pain.  

She was evaluated by the emergency room staff, who obtained a CT scan, which 

revealed a linear calcification approximately 2 mm long and 8 mm wide into the 

right UPJ with moderate hydronephrosis.  She was also noted to have a 3 cm left 

upper pole stone.  The patient was noted to have a low grade fever in the 

emergency room.  Her white count showed normal white count but having left 

shift and 93 neutrophils.  Her urine showed 53 WBCs per high powered field with 

a few bacteria.  Her lactate was elevated at 2.3 and creatinine was elevated to 

1.6, up from baseline of 1.1.  Blood and urine cultures have been obtained.  

She was given broad spectrum antibiotics with Keflex and Rocephin in the 

emergency room. She underwent right ureteral stent placement for decompression 

and drainage of her right system with ureteroscopy by Dr. varghese on 6/14/18 


6/15:


The patient is feeling better with the pain on her flank area after the stent. 

She is afebrile and hemodynamically stable. She is on Rocephin 2gm /day. She 

denies any complaint.





Patient Complains of:


Neurological:  No: Confusion, Weakness, Dizziness


Cardiovascular:  No: Chest Pain, Palpitations


Respiratory:  No: Cough, Congestion, Shortness of Breath, Wheezing, Other


Gastrointestinal:  No Nausea, No Vomiting


Genitourinary:  No Dysuria, No Hematuria


Musculoskeletal:  No: Pain, Sprain





Physical Exam





Vital Signs








  Date Time  Temp Pulse Resp B/P (MAP) Pulse Ox O2 Delivery O2 Flow Rate FiO2


 


6/15/18 19:51 97.8 61 18 162/102 (122) 96 Nasal Cannula 2.0 














Intake and Output 


 


 6/16/18





 07:00


 


Intake Total 840 ml


 


Output Total 750 ml


 


Balance 90 ml


 


 


 


Intake Oral 840 ml


 


Output Urine Total 750 ml








General Appearance:  Alert, Awake, No Acute Distress, Afebrile, Other (obesity)


Neuro:  No Gross deficits


Eyes:  PERRLA


ENT:  Normal


Cardiovascular:  Normal Rhythm & Peripheral Pulses


Respiratory:  No Respiratory Distress


GI:  Soft and Non-Tender


:  No CVA Tenderness


Extremities:  Soft and Non Tender, Warm, Edema


Psych:  Alert & Oriented X3, Appropriate Mood & Affect


Result Diagram:  


6/15/18 0533                                                                   

             6/15/18 0533








Assessment and Plan


Problems:  


(1) Hydronephrosis with ureteropelvic junction (UPJ) obstruction


Status:  Acute


Assessment & Plan:  She presented with right abdominal pain and flank pain for 

about 12 hours prior to admission.  She had a stent placed today.  Lactate now 

normal.  BP still high.  P now wnl.  She was given ceftriaxone in the ER and 

Primaxin in the OR.  Previous urine cultures are sensitive to ceftriaxone, so 

will continue, but at 2g q24 hours.  Urine and blood cultures are pending.


6/15:


I will continue her Rocephin 2 gm IV daily


I will use Nystatin powder for her yeast infection


I will get CBC in am


I will continue IVF NS at 60ml/h





(2) HTN (hypertension)


Status:  Chronic


Assessment & Plan:  Continue oral metoprolol and will use IV metoprolol for prn 

coverage.  She might need an additional medication for better control.





(3) CKD (chronic kidney disease) stage 3, GFR 30-59 ml/min


Status:  Chronic


Assessment & Plan:  Cr baseline appears to be around 1.1.  Currently, 1.8.  

Currently getting IVF and obstruction has been relieved.  Will follow.





Time Spent on Plan of Care:  < 30 min


Copies to:   HIEU FERNANDO DO; KANIKA VARGHESE MD





Exam


Sepsis Risk:  No Definite Risk











JONATAN ZENDEJAS MD Dwaine 15, 2018 20:24

## 2018-06-16 VITALS — DIASTOLIC BLOOD PRESSURE: 108 MMHG | SYSTOLIC BLOOD PRESSURE: 182 MMHG

## 2018-06-16 VITALS — DIASTOLIC BLOOD PRESSURE: 115 MMHG | SYSTOLIC BLOOD PRESSURE: 180 MMHG

## 2018-06-16 VITALS — DIASTOLIC BLOOD PRESSURE: 94 MMHG | SYSTOLIC BLOOD PRESSURE: 176 MMHG

## 2018-06-16 VITALS — DIASTOLIC BLOOD PRESSURE: 93 MMHG | SYSTOLIC BLOOD PRESSURE: 176 MMHG

## 2018-06-16 VITALS — DIASTOLIC BLOOD PRESSURE: 110 MMHG | SYSTOLIC BLOOD PRESSURE: 197 MMHG

## 2018-06-16 VITALS — DIASTOLIC BLOOD PRESSURE: 92 MMHG | SYSTOLIC BLOOD PRESSURE: 160 MMHG

## 2018-06-16 VITALS — DIASTOLIC BLOOD PRESSURE: 97 MMHG | SYSTOLIC BLOOD PRESSURE: 180 MMHG

## 2018-06-16 VITALS — SYSTOLIC BLOOD PRESSURE: 168 MMHG | DIASTOLIC BLOOD PRESSURE: 96 MMHG

## 2018-06-16 LAB — PLATELET COUNT, AUTOMATED: 174 K/UL (ref 150–450)

## 2018-06-16 RX ADMIN — NIFEDIPINE SCH MG: 30 TABLET, FILM COATED, EXTENDED RELEASE ORAL at 09:56

## 2018-06-16 RX ADMIN — CEPHALEXIN SCH MG: 500 CAPSULE ORAL at 17:17

## 2018-06-16 RX ADMIN — NYSTATIN SCH APP: 100000 POWDER TOPICAL at 08:39

## 2018-06-16 RX ADMIN — NYSTATIN SCH APP: 100000 POWDER TOPICAL at 20:50

## 2018-06-16 RX ADMIN — ONDANSETRON HYDROCHLORIDE PRN MG: 2 INJECTION, SOLUTION INTRAMUSCULAR; INTRAVENOUS at 07:28

## 2018-06-16 RX ADMIN — METOPROLOL TARTRATE SCH MG: 50 TABLET, FILM COATED ORAL at 08:39

## 2018-06-16 RX ADMIN — DOCUSATE SODIUM SCH MG: 100 CAPSULE, LIQUID FILLED ORAL at 20:50

## 2018-06-16 RX ADMIN — METOPROLOL TARTRATE SCH MG: 50 TABLET, FILM COATED ORAL at 20:50

## 2018-06-16 RX ADMIN — DOCUSATE SODIUM SCH MG: 100 CAPSULE, LIQUID FILLED ORAL at 08:38

## 2018-06-16 NOTE — HOSPITALIST PROGRESS NOTE
Subjective


Progress Notes


Subjective


Mrs. Maria is a 72-year-old white female with PMH of HTN, Asthma, Obesity, 

Anxiety and Depression, Gall stone Pancreatitis and a history of recurrent 

kidney stones and urinary tract infections who presented to the emergency room 

in the morning hours of June 14/18 with right sided abdominal and flank pain.  

She was evaluated by the emergency room staff, who obtained a CT scan, which 

revealed a linear calcification approximately 2 mm long and 8 mm wide into the 

right UPJ with moderate hydronephrosis.  She was also noted to have a 3 cm left 

upper pole stone.  The patient was noted to have a low grade fever in the 

emergency room.  Her white count showed normal white count but having left 

shift and 93 neutrophils.  Her urine showed 53 WBCs per high powered field with 

a few bacteria.  Her lactate was elevated at 2.3 and creatinine was elevated to 

1.6, up from baseline of 1.1.  Blood and urine cultures have been obtained.  

She was given broad spectrum antibiotics with Keflex and Rocephin in the 

emergency room. She underwent right ureteral stent placement for decompression 

and drainage of her right system with ureteroscopy by Dr. rodrigues on 6/14/18 


6/15:


The patient is feeling better with the pain on her flank area after the stent. 

She is afebrile and hemodynamically stable. She is on Rocephin 2gm /day. She 

denies any complaint.


6/16:


She is feeling tired and her BP is high 182/108 and she denies any headache or 

chest pain. Her UCX is positive for Proteus Mirabilis and sensitive to Ancef. I 

discussed the case with Dr. Rodrigues and decided to change the antibiotics to 

Keflex.





Patient Complains of:


Neurological:  Weakness, No: Confusion, Dizziness


Cardiovascular:  No: Chest Pain, Palpitations


Respiratory:  No: Cough, Congestion, Shortness of Breath


Gastrointestinal:  Nausea, No Vomiting


Genitourinary:  No Dysuria, No Hematuria


Musculoskeletal:  No: Pain, Sprain, Strain





Physical Exam





Vital Signs








  Date Time  Temp Pulse Resp B/P (MAP) Pulse Ox O2 Delivery O2 Flow Rate FiO2


 


6/16/18 11:02 97.9 59 16 160/92 (114) 96 Nasal Cannula 2.0 














Intake and Output 


 


 6/17/18





 07:00


 


Intake Total 980 ml


 


Balance 980 ml


 


 


 


Intake Oral 980 ml








General Appearance:  Alert, Awake, No Acute Distress, Afebrile


Neuro:  No Gross deficits


Eyes:  PERRLA


Cardiovascular:  Normal Rhythm & Peripheral Pulses


Respiratory:  No Respiratory Distress


GI:  Soft and Non-Tender


:  No CVA Tenderness


Extremities:  Soft and Non Tender


Psych:  Alert & Oriented X3, Appropriate Mood & Affect


Result Diagram:  


6/16/18 0550                                                                   

             6/16/18 0550








Assessment and Plan


Problems:  


(1) Hydronephrosis with ureteropelvic junction (UPJ) obstruction


Status:  Acute


Assessment & Plan:  She presented with right abdominal pain and flank pain for 

about 12 hours prior to admission.  She had a stent placed today.  Lactate now 

normal.  BP still high.  P now wnl.  She was given ceftriaxone in the ER and 

Primaxin in the OR.  Previous urine cultures are sensitive to ceftriaxone, so 

will continue, but at 2g q24 hours.  Urine and blood cultures are pending.


6/15:


I will continue her Rocephin 2 gm IV daily


I will use Nystatin powder for her yeast infection


I will get CBC in am


I will continue IVF NS at 60ml/h


6/16:


I will stop her Rocephin 


I will start Keflex 500mg po bid 


Possible d/c in am as per Dr. Rodrigues





(2) HTN (hypertension)


Status:  Chronic


Assessment & Plan:  Continue oral metoprolol and will use IV metoprolol for prn 

coverage.  She might need an additional medication for better control.


6/16:


Her BP is high 182/108 and she received her Metoprolol


I will also add Procardia XL 30mg po q daily and explained to the patient





(3) CKD (chronic kidney disease) stage 3, GFR 30-59 ml/min


Status:  Chronic


Assessment & Plan:  Cr baseline appears to be around 1.1.  Currently, 1.8.  

Currently getting IVF and obstruction has been relieved.  Will follow.


6/16:


Her kidney function is gradually improving with Cr 1.3 and GFR 40ml/min from Cr 

1.8





Time Spent on Plan of Care:  > 30 min


Copies to:   KANIKA RODRIGUES MD





Exam


Sepsis Risk:  No Definite Risk











JONATAN ZENDEJAS MD Jun 16, 2018 13:34

## 2018-06-17 VITALS — DIASTOLIC BLOOD PRESSURE: 84 MMHG | SYSTOLIC BLOOD PRESSURE: 155 MMHG

## 2018-06-17 VITALS — DIASTOLIC BLOOD PRESSURE: 92 MMHG | SYSTOLIC BLOOD PRESSURE: 160 MMHG

## 2018-06-17 VITALS — SYSTOLIC BLOOD PRESSURE: 177 MMHG | DIASTOLIC BLOOD PRESSURE: 92 MMHG

## 2018-06-17 RX ADMIN — DOCUSATE SODIUM SCH MG: 100 CAPSULE, LIQUID FILLED ORAL at 08:45

## 2018-06-17 RX ADMIN — CEPHALEXIN SCH MG: 500 CAPSULE ORAL at 00:19

## 2018-06-17 RX ADMIN — ONDANSETRON HYDROCHLORIDE PRN MG: 2 INJECTION, SOLUTION INTRAMUSCULAR; INTRAVENOUS at 07:43

## 2018-06-17 RX ADMIN — NYSTATIN SCH APP: 100000 POWDER TOPICAL at 08:45

## 2018-06-17 RX ADMIN — NIFEDIPINE SCH MG: 30 TABLET, FILM COATED, EXTENDED RELEASE ORAL at 08:45

## 2018-06-17 RX ADMIN — METOPROLOL TARTRATE SCH MG: 50 TABLET, FILM COATED ORAL at 08:45

## 2018-06-17 RX ADMIN — CEPHALEXIN SCH MG: 500 CAPSULE ORAL at 05:50

## 2018-06-17 RX ADMIN — CEPHALEXIN SCH MG: 500 CAPSULE ORAL at 11:16

## 2018-06-17 NOTE — HOSPITALIST PROGRESS NOTE
Subjective


Progress Notes


Subjective


This patient was admitted for renal stones.  She had no acute issues overnight.





Patient Complains of:


Cardiovascular:  No: Chest Pain


Respiratory:  No: Shortness of Breath





Physical Exam





Vital Signs








  Date Time  Temp Pulse Resp B/P (MAP) Pulse Ox O2 Delivery O2 Flow Rate FiO2


 


6/17/18 07:52     93 Nasal Cannula 2.0 


 


6/17/18 07:35 98.2 52 18 155/84 (107)    














Intake and Output 


 


 6/18/18





 07:00


 


Intake Total 780 ml


 


Balance 780 ml


 


 


 


Intake Oral 780 ml








Cardiovascular:  Regular Rate and Rhythm


Respiratory:  Clear to Auscultation


Result Diagram:  


6/16/18 0550                                                                   

             6/17/18 0721














Item Value  Date Time


 


Urine Culture - Final Complete 6/14/18 1209





Ureter Urine Right Proteus Mirabilis 


 


Blood Culture - Final Complete 6/14/18 0630





Blood Line Draw  


 


Blood Culture - Final Complete 6/14/18 0628





Blood  


 


Urine Culture - Final Complete 6/14/18 0543





Cath Urine Proteus Mirabilis 











Assessment and Plan


Problems:  


(1) Hydronephrosis with ureteropelvic junction (UPJ) obstruction


Status:  Acute


Assessment & Plan:  She presented with right abdominal pain and flank pain.  

She did undergo stent placement with Dr. Rodrigues.





(2) UTI (urinary tract infection)


Assessment & Plan:  Her urine culture was positive for Proteus.  She was 

initially on treatment with ceftriaxone, but has now been converted to oral 

Keflex.





(3) Sepsis


Assessment & Plan:  She did have an elevated WBC and lactic acidosis.  Her 

blood cultures were also positive for Proteus.  She is on Keflex as above.





(4) HTN (hypertension)


Status:  Chronic


Assessment & Plan:  She is on chronic treatment with metoprolol, but was having 

elevated blood pressures during this admission.  Nifedipine was added, and she 

is instructed to follow up with her primary care physician for ongoing 

treatment.





(5) CKD (chronic kidney disease) stage 3, GFR 30-59 ml/min


Status:  Chronic





Exam


Sepsis Risk:  No Definite Risk





Problem Qualifiers





(1) HTN (hypertension):  


Hypertension type:  essential hypertension  Qualified Codes:  I10 - Essential (

primary) hypertension








RIP GARCIA DO Jun 17, 2018 11:16

## 2018-06-17 NOTE — DISCHARGE SUMMARY
DATE OF ADMISSION:  June 14, 2018

DATE OF DISCHARGE:  June 17, 2018





FINAL DIAGNOSES

1.  Bilateral kidney stones with obstructing right proximal stone.

2.  Urinary tract infection.



PROCEDURE PERFORMED

Right internal double-J ureter stent placement.



HISTORY OF PRESENT ILLNESS 

Patient is a 72-year-old white female with a history of proteus UTI with 
obstructing stone seven years ago, who now presented to the hospital with 
abdominal pain.  A CT scan showed bilateral stones.  She had a 3 mm partial 
staghorn in the left upper pole without evidence of obstruction.  On the right, 
she had a linear 8 x 6 x 20 mm stone at the right UPJ.  Her urine was infected.
  She was, therefore, admitted to the hospital for intervention and antibiotics.



SUMMARY OF HOSPITAL COURSE 

Patient was admitted to the hospital and taken to the operating room on the 
14th of June, at which time she underwent placement of a right internal double-
J stent.  At stent placement, she was noted to have purulent discharge from 
around and through the stent.  Urine and blood cultures were obtained.  The 
patient was admitted to the hospital post procedure.  Her white count was 
elevated at 17.7.  She had a low-grade fever.  She was started on Rocephin 2 g 
twice a day.  Over the next two days, her fever and white count stabilized.  
She subsequently grew out proteus from both blood and urine cultures.  She was 
switched to p.o. Keflex on the 16th.  On the 17th, she remained afebrile 
without complaints except for some mild stent discomfort and was deemed ready 
for discharge.  



CONDITION AT TIME OF DISCHARGE

Good.



DISCHARGE INSTRUCTIONS

Activities ad corby.  Diet is regular.  



DISCHARGE MEDICATIONS 

1.  Keflex.

2.  Colace.

3.  Pyridium.

4.  Norco.

5.  Ditropan.



FOLLOWUP 

Patient is to call the Urology Clinic in the next week to set up followup.  
Return to operating room for stent removal and treatment of her right stone, 
followed by the left.  It has been stressed that this stent is a temporary 
device and must be removed over the next several weeks.  Patient voices 
understanding.  She is also to follow up with Dr. Ireland regarding her other 
medical problems including hypertension.  

KACIE

## 2018-07-16 LAB
INR PPP: 0.95
PLATELET COUNT, AUTOMATED: 208 K/UL (ref 150–450)

## 2018-07-18 NOTE — HISTORY AND PHYSICAL
DATE OF ADMISSION:  July 19, 2018





CHIEF COMPLAINT  

Kidney stones.



HISTORY OF PRESENT ILLNESS 

Patient is a 72-year-old female with a long history of kidney stones who had 
recently presented to the Emergency Room with early urosepsis on June 14th.  At 
that time, a CT scan was performed, and she was noted to have a linear 
calcification in the proximal right ureter measuring 8 x 6 x 20 mm.  She was 
also noted to have a 3 cm partial staghorn in the left upper pole without 
evidence of obstruction.  She subsequently was taken to the operating room and 
underwent stent placement which drained grossly purulent material.  Both urine 
and blood cultures subsequently grew out proteus, and she has been 
appropriately treated for this.  She is now being returned to the operating 
room for planned right extracorporeal shock wave lithotripsy and/or 
ureteroscopy as indicated, to be shortly followed for left treatment.  



PAST MEDICAL HISTORY 

* Hypertension. 

* Anxiety and depression.

* Obesity.

* Asthma with slight hypoxia, on O2.

* Urinary incontinence.

* Urinary tract infections. 

* Pancreatitis secondary to gallstones.

* Kidney stones since 1980s.



PAST SURGICAL HISTORY 

* Right double-J stent placement with obstructing stones with proteus infection 
in April 2011.

* Right ureteroscopy with extracorporeal shock wave lithotripsy May 2011.

* Laparoscopic cholecystectomy December 2011.  

* Right double-J stent placement June 14, 2018.



ALLERGIES 

No known drug allergies.



CURRENT MEDICATIONS 

* Metoprolol.

* Procardia.

* Tylenol. 

* Oregano oil.



SOCIAL HISTORY 

Patient lives in [place] and is a .  



REVIEW OF SYSTEMS 

Patient denies chest pain, productive cough, fever, chills, gross hematuria, 
nausea, vomiting, or liver disease.  



PHYSICAL EXAMINATION 

GENERAL:  Patient is a moderately obese white female in no acute distress.  

HEENT:  Normocephalic, atraumatic.  

CHEST:  Clear to auscultation bilaterally.  

CARDIOVASCULAR:  Regular rate and rhythm. 

ABDOMEN:  Soft, nontender.  No masses are palpated.  

GENITOURINARY:  Deferred to the OR.

EXTREMITIES:  Without clubbing, cyanosis, or edema.

NEUROLOGIC:  Nonfocal.  



IMPRESSION 

A 72-year-old white female with a recent proteus urosepsis secondary to right 
obstructing ureteral stone.  In addition, she has a 3 cm left upper pole 
partial staghorn.  She is currently being decompressed with a double-J stent 
and treated appropriately with antibiotics. 



PLAN 

We will perform extracorporeal shock wave lithotripsy and/or ureteroscopy with 
possible stent removal on the right side with the current plan to follow with 
treatment on the left in the near future.  
KACIE

## 2018-07-19 ENCOUNTER — HOSPITAL ENCOUNTER (OUTPATIENT)
Dept: HOSPITAL 89 - OR | Age: 73
Discharge: HOME | End: 2018-07-19
Attending: UROLOGY
Payer: MEDICARE

## 2018-07-19 VITALS — SYSTOLIC BLOOD PRESSURE: 158 MMHG | DIASTOLIC BLOOD PRESSURE: 91 MMHG

## 2018-07-19 VITALS — SYSTOLIC BLOOD PRESSURE: 167 MMHG | DIASTOLIC BLOOD PRESSURE: 99 MMHG

## 2018-07-19 VITALS — DIASTOLIC BLOOD PRESSURE: 94 MMHG | SYSTOLIC BLOOD PRESSURE: 157 MMHG

## 2018-07-19 VITALS — DIASTOLIC BLOOD PRESSURE: 102 MMHG | SYSTOLIC BLOOD PRESSURE: 168 MMHG

## 2018-07-19 VITALS — BODY MASS INDEX: 49.41 KG/M2 | HEIGHT: 64.5 IN | WEIGHT: 293 LBS

## 2018-07-19 VITALS — DIASTOLIC BLOOD PRESSURE: 100 MMHG | SYSTOLIC BLOOD PRESSURE: 146 MMHG

## 2018-07-19 DIAGNOSIS — N20.0: Primary | ICD-10-CM

## 2018-07-19 DIAGNOSIS — I12.9: ICD-10-CM

## 2018-07-19 DIAGNOSIS — J45.909: ICD-10-CM

## 2018-07-19 DIAGNOSIS — F32.9: ICD-10-CM

## 2018-07-19 DIAGNOSIS — E66.9: ICD-10-CM

## 2018-07-19 DIAGNOSIS — F41.9: ICD-10-CM

## 2018-07-19 DIAGNOSIS — N18.3: ICD-10-CM

## 2018-07-19 PROCEDURE — 74420 UROGRAPHY RTRGR +-KUB: CPT

## 2018-07-19 PROCEDURE — 52332 CYSTOSCOPY AND TREATMENT: CPT

## 2018-07-19 PROCEDURE — 36415 COLL VENOUS BLD VENIPUNCTURE: CPT

## 2018-07-19 PROCEDURE — 85610 PROTHROMBIN TIME: CPT

## 2018-07-19 PROCEDURE — 74176 CT ABD & PELVIS W/O CONTRAST: CPT

## 2018-07-19 PROCEDURE — 81001 URINALYSIS AUTO W/SCOPE: CPT

## 2018-07-19 PROCEDURE — 85025 COMPLETE CBC W/AUTO DIFF WBC: CPT

## 2018-07-19 PROCEDURE — 50590 FRAGMENTING OF KIDNEY STONE: CPT

## 2018-07-19 NOTE — RADIOLOGY IMAGING REPORT
FACILITY: [facility] [place] 

 

PATIENT NAME: Mila Maria

: 1945

MR: [id number]

V: [id number]

EXAM DATE: 

ORDERING PHYSICIAN: KANIKA VARGHESE

TECHNOLOGIST: 

 

Location: [facility]

Patient: Mila Maria

: 1945

MRN: [id number]

Visit/Account:[id number]

Date of Sevice:  2018

 

ACCESSION #: [id number]

 

ABDOMEN PELVIS ESWL CYSTO W/O

 

EXAMINATION:

CT abdomen/pelvis without IV contrast

 

Additional Pertinent history: Kidney stones

 

TECHNIQUE:   Spiral scan was obtained through the abdomen/pelvis last pelvis without intravenous cont
rast.

 

One of the following dose optimization techniques was utilized in the performance of this exam: Autom
ated exposure control; adjustment of the mA and/or kV according to the patient's size; or use of an i
terative  reconstruction technique.  Specific details can be referenced in the facility's radiology C
T exam operational policy.

 

COMPARISON STUDIES:   2018.

 

FINDINGS:

Please note that without intravenous contrast, sensitivity to detection of parenchymal disease is fritz
ited.

 

Liver / biliary: There is a elliptical 1.5 x 0.9 calcific density with right lobe of the liver in the
 upper lateral aspect right lobe.

Pancreas: negative

Spleen: negative

Adrenal glands: Negative

Kidneys / retroperitoneum: Compared to the previous study of admission right ureteral stent and decom
pression previous right hydronephrosis.  The obstructing stone proximal right ureter seen on the prev
ious examination is no longer identified.  Partial upper pole staghorn calculus in the left kidney re
identified.

 

Pelvis: Calcified uterine fibroid.  No adnexal mass lesions.

 

Bowel / peritoneum / mesenteries: No colonic mass lesion diverticuli without evidence of diverticulit
is.  Appendix normal no appendicitis.

 

Vessels: No aneurysmal dilatation.

 

Musculoskeletal / Body wall: Vacuum phenomenon

 

Lymph node assessment: negative

 

Lower chest: negative

 

IMPRESSION:

1.  Decompressed right renal obstruction previously seen and described on 2018 after placement w
ith double-J ureteral stent within the right ureter.  No evidence of residual stones within the right
 kidney.  Persistent periureteral stranding.  2.  Partial staghorn calculus in the upper pole of the 
left kidney measuring 2.7 x 1.6 cm in greatest dimension

3.  Calcification in the right lobe of liver likely sequela from previous trauma/infection.  This was
 seen on of CT scan of the abdomen dating back to 

 

Report Dictated By: Matthew Lama MD at 2018 8:07 AM

 

Report E-Signed By: Matthew Lama MD  at 2018 8:20 AM

 

WSN:[id number]

## 2018-07-19 NOTE — RADIOLOGY IMAGING REPORT
FACILITY: [facility] 

 

PATIENT NAME: Mila Maria

: 1945

MR: [id number]

V: [id number]

EXAM DATE: 

ORDERING PHYSICIAN: KANIKA VARGHESE

TECHNOLOGIST: 

 

Location: [facility]

Patient: Mila Maria

: 1945

MRN: [id number]

Visit/Account:[id number]

Date of Sevice:  2018

 

ACCESSION #: [id number]

 

EXAMINATION:  Intraoperative fluoroscopy with abdominal imaging  2018 12:53 PM

 

HISTORY: Stones.  Stent placement.

 

COMPARISON:  CT today

 

FLUOROSCOPY TIME: 0.29 minutes

 

DOSE: 46.42 mGy

 

IMAGES: 18

 

FINDINGS:  Intraoperative fluoroscopy was provided with imaging of the abdomen.  Initial image shows 
a right ureteral stent with subsequent is some indentation on the right followed by the left.  There 
is a left ureteral stent on the final image.

 

IMPRESSION:

Intraoperative fluoroscopy for retrograde with stent placement.

 

Report Dictated By: Paulie Styles MD at 2018 4:06 PM

 

Report E-Signed By: Paulie Styles MD  at 2018 4:08 PM

 

WSN:[id number]

## 2018-07-19 NOTE — OPERATIVE REPORT 1
EVENT DATE:  July 19, 2018

SURGEON:  Hua Rodrigues MD 

ANESTHESIOLOGIST:  Benedicto Rios MD

ANESTHESIA:  General anesthetic.





PREOPERATIVE DIAGNOSES 

1.  Right indwelling ureteral stent with history of obstructing infected 
ureteral stone. 

2.  Left upper pole partial staghorn calculus. 



POSTOPERATIVE DIAGNOSES  

1.  Right indwelling ureteral stent with history of obstructing infected 
ureteral stone. 

2.  Left upper pole partial staghorn calculus. 



PROCEDURES PERFORMED

1.  Cystoscopy. 

2.  Grasping and removal of right double-J stent. 

3.  Right semi-rigid ureteroscopy. 

4.  Right retrograde polygram. 

5.  Left internal double-J ureteral stent placement. 

6.  Left upper pole extracorporeal shock wave lithotripsy. 



ESTIMATED BLOOD LOSS

Minimal. 



INTRAVENOUS FLUIDS

Crystalloids.



DRAINS

A 6-Barbadian x 24 cm Contour stent on left.



COMPLICATIONS 

None.



CONDITION

The patient was taken to the recovery room awake and in stable condition. 



STATEMENT OF MEDICAL NECESSITY 

Patient is a 72-year-old white female who presented with an obstructing 
infected stone approximately one month ago. Her blood and urine cultures 
eventually grew out proteus.  She was taken to the operating room on the 14th of June and underwent right internal double-J stent placement for an 8 mm x 20 
mm long calcification in the right proximal ureter.  At stent placement, she 
had a copious amount of purulent material drained from the kidney.  Followup x-
ray today reveals good stent placement.  There is no obvious stone along the 
stent or in the right kidney.  Her left stone has remained unchanged.  She is 
now being brought to the operating room for planned stent removal, possible 
ureteroscopy stone treatment on the right, followed by stent placement on the 
left with extracorporeal shock wave lithotripsy. 



DESCRIPTION OF PROCEDURE PERFORMED

The patient was brought to the operating room.  After general anesthetic was 
obtained, she was placed in the dorsal lithotomy position and prepped and 
draped in the usual sterile manner.  Anesthetic cystoscopy was performed with a 
21-Barbadian rigid sheath and a 30-degree lens. The stent was seen emanating from 
the left ureteral orifice.  It was grasped at a distal end and brought out of 
the meatus.  A 0.035 wire was then introduced into the lumen of the double-J 
stent and advanced in a retrograde manner under fluoroscopic imaging to the 
upper pole calyx.  The stent was removed, and this wire was used to place an 
ATN dilating system.  Two wires were placed.  One wire was secured to the 
drapes as a safety wire.  The next wire was used as a working wire and advanced 
in the working channel of the semi-rigid Mascorro ureteroscope.  Semi-rigid 
ureteroscopy was performed over the wire under direct vision with the aid of 
the camera.  The scope easily advanced up the entire ureter without any 
resistance.  There was no evidence of stone, stricture, or other anomaly from 
the ureteral orifice up to the renal pelvis.  The working wire was removed and 
pull-out uroscopy performed.  The entire ureter was inspected and also appeared 
normal.  At this point, the safety wire was removed.  The cystoscope was then 
replaced, and a 6-Barbadian open-end access catheter was used to perform a right 
retrograde polygram by injecting 7 mL of contrast material in a retrograde 
manner.  By my intraoperative interpretation, she had a normal retrograde 
polygram without evidence of filling defect or obstruction.  Following this, the
-6 Barbadian access catheter was advanced into the distal left ureter.  A 0.035 
wire was advanced up the ureter to the upper pole calyx.  The access catheter 
was removed, and the wire was used to place a 6-Barbadian x 24 cm Contour stent.  
The wire was removed.  She was noted to have good coiling in the renal pelvis 
by fluoroscopy and good coiling in the bladder by direct vision.  At this point
, the scope was removed and an 18-Barbadian Le catheter placed.  She was then 
transferred from the cystoscopic table to the lithotripsy table.  She was 
placed supine with her pressure points padded.  Two-plane fluoroscopy was used 
to localize the left upper pole stone.  After placing the crosshairs in two 
planes, treatment was begun at a power setting of 3 and gradually increased to 
a power setting of 3.5 over the course of the first 300 shocks.  A 3-minute 
pause was then performed, and treatment was resumed.  She received a total of 
3000 shocks to the upper pole stone.  Fragmenting was first noted at 
approximately 1000 shocks.  The power was gradually increased to a power 
setting of 8.  At the conclusion of treatment, she appeared to have excellent 
fragmentation of this large stone.   She was then awakened in the operating 
room and taken to the recovery area in stable condition. 



PLAN

The plan will be to remove her Le catheter, and she will be discharged home 
today on Bactrim, Norco, Colace, Ditropan XL, and Pyridium.  We will plan to 
see her in the Urology Clinic in approximately four to six weeks with a 
followup x-ray to evaluate treatment results.  If she is deemed mostly stone-
free, we will remove her stent in the office versus return to the operating 
room for follow up lithotripsy and/or ESWL.  
KACIE

## 2018-08-03 ENCOUNTER — HOSPITAL ENCOUNTER (EMERGENCY)
Dept: HOSPITAL 89 - ER | Age: 73
LOS: 1 days | Discharge: HOME | End: 2018-08-04
Payer: MEDICARE

## 2018-08-03 VITALS — DIASTOLIC BLOOD PRESSURE: 77 MMHG | SYSTOLIC BLOOD PRESSURE: 153 MMHG

## 2018-08-03 DIAGNOSIS — N39.0: Primary | ICD-10-CM

## 2018-08-03 DIAGNOSIS — B96.4: ICD-10-CM

## 2018-08-03 LAB — PLATELET COUNT, AUTOMATED: 233 K/UL (ref 150–450)

## 2018-08-03 PROCEDURE — 82565 ASSAY OF CREATININE: CPT

## 2018-08-03 PROCEDURE — 99284 EMERGENCY DEPT VISIT MOD MDM: CPT

## 2018-08-03 PROCEDURE — 82247 BILIRUBIN TOTAL: CPT

## 2018-08-03 PROCEDURE — 74018 RADEX ABDOMEN 1 VIEW: CPT

## 2018-08-03 PROCEDURE — 84520 ASSAY OF UREA NITROGEN: CPT

## 2018-08-03 PROCEDURE — 87186 SC STD MICRODIL/AGAR DIL: CPT

## 2018-08-03 PROCEDURE — 82947 ASSAY GLUCOSE BLOOD QUANT: CPT

## 2018-08-03 PROCEDURE — 82435 ASSAY OF BLOOD CHLORIDE: CPT

## 2018-08-03 PROCEDURE — 82040 ASSAY OF SERUM ALBUMIN: CPT

## 2018-08-03 PROCEDURE — 84075 ASSAY ALKALINE PHOSPHATASE: CPT

## 2018-08-03 PROCEDURE — 84460 ALANINE AMINO (ALT) (SGPT): CPT

## 2018-08-03 PROCEDURE — 84132 ASSAY OF SERUM POTASSIUM: CPT

## 2018-08-03 PROCEDURE — 96375 TX/PRO/DX INJ NEW DRUG ADDON: CPT

## 2018-08-03 PROCEDURE — 85025 COMPLETE CBC W/AUTO DIFF WBC: CPT

## 2018-08-03 PROCEDURE — 81001 URINALYSIS AUTO W/SCOPE: CPT

## 2018-08-03 PROCEDURE — 87088 URINE BACTERIA CULTURE: CPT

## 2018-08-03 PROCEDURE — 82374 ASSAY BLOOD CARBON DIOXIDE: CPT

## 2018-08-03 PROCEDURE — 87077 CULTURE AEROBIC IDENTIFY: CPT

## 2018-08-03 PROCEDURE — 84450 TRANSFERASE (AST) (SGOT): CPT

## 2018-08-03 PROCEDURE — 96374 THER/PROPH/DIAG INJ IV PUSH: CPT

## 2018-08-03 PROCEDURE — 84155 ASSAY OF PROTEIN SERUM: CPT

## 2018-08-03 PROCEDURE — 84295 ASSAY OF SERUM SODIUM: CPT

## 2018-08-03 PROCEDURE — 82310 ASSAY OF CALCIUM: CPT

## 2018-08-03 NOTE — RADIOLOGY IMAGING REPORT
FACILITY: [facility] [place] 

 

PATIENT NAME: Mila Maria

: 1945

MR: [id number]

V: [id number]

EXAM DATE: 

ORDERING PHYSICIAN: JAVY MARY

TECHNOLOGIST: 

 

Location: [facility] [place]

Patient: Mila Maria

: 1945

MRN: [id number]

Visit/Account:[id number]

Date of Sevice:  2018

 

ACCESSION #: [id number]

 

INDICATION:  left flank pain, h/o stent

 

EXAM DATE:  8/3/2018 10:39 PM

 

COMPARISON: Fluoroscopic images and CT abdomen and pelvis 2018.

 

FINDINGS:

2 AP supine images of the abdomen.

 

There is a left ureteral stent in place with pigtails in the expected locations of the collecting sys
tem and urinary bladder.  Previously seen staghorn calculus in the left kidney is not well demonstrat
ed.  Cholecystectomy clips noted.

 

Bowel gas pattern is nonobstructive. No pneumatosis, pneumoperitoneum or portal venous gas. No eviden
ce of large volume ascites or mass.

 

No acute osseous abnormality.

 

 

IMPRESSION: Left ureteral stent appears grossly appropriately positioned, otherwise unremarkable.

 

Report Dictated By: Vaughn Carlson MD at 8/3/2018 11:24 PM

 

Report E-Signed By: Vaughn Carlson MD  at 8/3/2018 11:28 PM

 

WSN:[id number]

## 2018-08-03 NOTE — ER REPORT
History and Physical


Time Seen By MD:  20:44


Hx. of Stated Complaint:  


ABD PAIN LEFT FLANK


HPI/ROS


CHIEF COMPLAINT: flank pain





HISTORY OF PRESENT ILLNESS: This is a 72 year old female. She has a history of 

kidney stones and has a left sided ureteral stent. She is having pain in the 

left flank now, not improved with her home Lortab. Severe pain. Nothing makes 

it worse or better. She has some pain with urination. No fevers, but having 

chills. No shortness of breath. No chest pain. Having some nausea.





REVIEW OF SYSTEMS:


As above.


Allergies:  


Coded Allergies:  


     Sulfa (Sulfonamide Antibiotics) (Verified  Allergy, Unknown, 8/3/18)


Home Meds


Active Scripts


Levofloxacin 500 Mg Tab (LEVAQUIN 500 MG TAB) 500 Mg Tablet, 500 MG PO QDAY, #

10 TAB 0 Refills


   Prov:JAVY MARY MD         8/3/18


Promethazine Hcl (PROMETHAZINE HCL) 25 Mg Tablet, 25 MG PO Q8H Y for NAUSEA/

VOMITING, #20 TAB 0 Refills


   Prov:JAVY MARY MD         8/3/18


Oxycodone Hcl/Acetaminophen (PERCOCET 5-325 MG TABLET) 1 Each Tablet, 1 EACH PO 

Q4H Y for PAIN, #12 TAB 0 Refills


   Prov:JAVY MARY MD         8/3/18


Nifedipine (PROCARDIA XL) 30 Mg Tab.er.24, 30 MG PO QDAY, #30 TAB


   Prov:RIP GARCIA DO         6/17/18


Reported Medications


Docusate Sodium (COLACE) 100 Mg Capsule, 100 MG PO BID, #30 CAPSULE


   7/19/18


Hydrocodone Bit/Acetaminophen (NORCO 5-325 TABLET) 1 Each Tablet, 1-2 EACH PO 

Q6H Y for PAIN, #30 TAB


   7/19/18


Hydrocodone Bit/Acetaminophen (HYDROCODON-ACETAMINOPHEN 5-325) 1 Each Tablet, 1 

EACH PO HS, TAB


   7/12/18


Metoprolol Tartrate (Metoprolol Tartrate) 100 Mg Tablet, 100 MG PO BID, #60 0 

Refills


   5/18/11


Discontinued Reported Medications


Phenazopyridine Hcl (PHENAZOPYRIDINE HCL) 200 Mg Tablet, 200 MG PO TID, TAB


   7/19/18


Oxybutynin Chloride (DITROPAN XL) 10 Mg Tab.er.24, 10 MG PO QDAY, #20 TAB


   7/19/18


Sulfamethoxazole/Trimet 800-160 Mg Tab (BACTRIM DS TABLET) 1 Each Tablet, 1 TAB 

PO BID, #14 TAB


   7/19/18


Reviewed Nurses Notes:  Yes


Hx Smoking:  No


Smoking Status:  Never Smoker


Hx Substance Use Disorder:  No


Hx Alcohol Use:  No


Constitutional





Vital Sign - Last 24 Hours








 8/3/18 8/3/18 8/3/18 8/3/18





 20:12 20:14 20:27 20:30


 


Temp  97.9  


 


Pulse 123 122 120 


 


Resp  24  


 


B/P (MAP) 214/115 (148) 214/115  215/112 (146)


 


Pulse Ox  94 95 


 


O2 Delivery  Room Air Room Air 


 


    





 8/3/18 8/3/18 8/3/18 8/3/18





 20:42 21:00 21:12 21:12


 


Pulse 117  103 


 


B/P (MAP)  181/104 (129)  


 


Pulse Ox 96  82 


 


O2 Delivery Room Air  Room Air 


 


O2 Flow Rate    2.0





 8/3/18 8/3/18 8/3/18 8/3/18





 21:27 21:30 21:35 21:50


 


Pulse 103  101 97


 


B/P (MAP)  190/101 (130)  


 


Pulse Ox 92  83 94


 


O2 Delivery Nasal Cannula   


 


O2 Flow Rate 2   





 8/3/18 8/3/18 8/3/18 8/3/18





 22:00 22:05 22:20 22:30


 


Pulse  ??? 104 


 


B/P (MAP) 190/111 (137)   188/103 (131)


 


Pulse Ox  94 95 





 8/3/18 8/3/18 8/3/18 8/3/18





 22:35 23:17 23:20 23:30


 


Pulse 102  102 


 


B/P (MAP)  158/82 (107)  159/82 (107)


 


Pulse Ox 96  95 





 8/3/18 8/3/18 8/3/18 8/4/18





 23:35 23:40 23:52 00:18


 


Pulse 98 98  


 


B/P (MAP)   153/77 (102) 


 


Pulse Ox 95 95  92


 


O2 Delivery    Room Air








Physical Exam


   General Appearance: The patient is alert. Acute distress due to pain.


Eyes: Pupils are equal, round. No pallor, injection or icterus.


ENT: Mucous membranes are moist. Normal oral mucosa


Respiratory: Lungs are clear to auscultation.


Cardiovascular: Regular rate and rhythm. No murmurs, gallops or rubs.


Gastrointestinal: abdomen with pain left side and flank. Nondistended.





DIFFERENTIAL DIAGNOSIS: After history and physical exam, differential diagnosis 

was considered for flank pain, likely related to the ureteral stent, or 

possibly due to obstruction or infection.





Medical Decision Making


Data Points


Result Diagram:  


8/3/18 2105                                                                    

            8/3/18 2105





Laboratory





Hematology








Test


  8/3/18


21:05 8/3/18


22:04


 


Red Blood Count


  4.95 M/uL


(4.17-5.56) 


 


 


Mean Corpuscular Volume


  88.1 fL


(80.0-96.0) 


 


 


Mean Corpuscular Hemoglobin


  30.1 pg


(26.0-33.0) 


 


 


Mean Corpuscular Hemoglobin


Concent 34.1 g/dL


(32.0-36.0) 


 


 


Red Cell Distribution Width


  15.8 %


(11.5-14.5) 


 


 


Mean Platelet Volume


  8.1 fL


(7.2-11.1) 


 


 


Neutrophils (%) (Auto)


  87.3 %


(39.4-72.5) 


 


 


Lymphocytes (%) (Auto)


  7.2 %


(17.6-49.6) 


 


 


Monocytes (%) (Auto)


  4.4 %


(4.1-12.4) 


 


 


Eosinophils (%) (Auto)


  0.1 %


(0.4-6.7) 


 


 


Basophils (%) (Auto)


  1.0 %


(0.3-1.4) 


 


 


Nucleated RBC Relative Count


(auto) 0.0 /100WBC 


  


 


 


Neutrophils # (Auto)


  8.4 K/uL


(2.0-7.4) 


 


 


Lymphocytes # (Auto)


  0.7 K/uL


(1.3-3.6) 


 


 


Monocytes # (Auto)


  0.4 K/uL


(0.3-1.0) 


 


 


Eosinophils # (Auto)


  0.0 K/uL


(0.0-0.5) 


 


 


Basophils # (Auto)


  0.1 K/uL


(0.0-0.1) 


 


 


Nucleated RBC Absolute Count


(auto) 0.00 K/uL 


  


 


 


Sodium Level


  138 mmol/L


(137-145) 


 


 


Potassium Level


  3.8 mmol/L


(3.5-5.0) 


 


 


Chloride Level


  106 mmol/L


() 


 


 


Carbon Dioxide Level


  20 mmol/L


(22-31) 


 


 


Blood Urea Nitrogen


  18 mg/dl


(7-18) 


 


 


Creatinine


  1.40 mg/dl


(0.52-1.04) 


 


 


Glomerular Filtration Rate


Calc 37.1 


  


 


 


Random Glucose


  133 mg/dl


() 


 


 


Calcium Level


  9.8 mg/dl


(8.4-10.2) 


 


 


Total Bilirubin


  0.8 mg/dl


(0.2-1.3) 


 


 


Aspartate Amino Transf


(AST/SGOT) 47 U/L (0-35) 


  


 


 


Alanine Aminotransferase


(ALT/SGPT) 45 U/L (0-56) 


  


 


 


Alkaline Phosphatase


  115 U/L


(0-126) 


 


 


Total Protein


  7.8 g/dl


(6.3-8.2) 


 


 


Albumin


  4.3 g/dl


(3.5-5.0) 


 


 


Urine Color  Yellow 


 


Urine Clarity  Cloudy 


 


Urine pH


  


  7.0 pH


(4.8-9.5)


 


Urine Specific Gravity  1.015 


 


Urine Protein


  


  100 mg/dL


(NEGATIVE)


 


Urine Glucose (UA)


  


  Negative mg/dL


(NEGATIVE)


 


Urine Ketones


  


  20 mg/dL


(NEGATIVE)


 


Urine Blood


  


  Moderate


(NEGATIVE)


 


Urine Nitrite


  


  Positive


(NEGATIVE)


 


Urine Bilirubin


  


  Negative


(NEGATIVE)


 


Urine Urobilinogen


  


  Negative mg/dL


(0.2-1.9)


 


Urine Leukocyte Esterase


  


  Large


(NEGATIVE)


 


Urine RBC


  


  59 /HPF


(0-2/HPF)


 


Urine WBC


  


  328 /HPF


(0-5/HPF)


 


Urine Squamous Epithelial


Cells 


  None /LPF


(NONE-FEW)


 


Urine Bacteria


  


  Many /HPF


(NONE-FEW)


 


Urine Mucus


  


  None /HPF


(NONE-FEW)








Chemistry








Test


  8/3/18


21:05 8/3/18


22:04


 


White Blood Count


  9.6 k/uL


(4.5-11.0) 


 


 


Red Blood Count


  4.95 M/uL


(4.17-5.56) 


 


 


Hemoglobin


  14.9 g/dL


(12.0-16.0) 


 


 


Hematocrit


  43.6 %


(34.0-47.0) 


 


 


Mean Corpuscular Volume


  88.1 fL


(80.0-96.0) 


 


 


Mean Corpuscular Hemoglobin


  30.1 pg


(26.0-33.0) 


 


 


Mean Corpuscular Hemoglobin


Concent 34.1 g/dL


(32.0-36.0) 


 


 


Red Cell Distribution Width


  15.8 %


(11.5-14.5) 


 


 


Platelet Count


  233 K/uL


(150-450) 


 


 


Mean Platelet Volume


  8.1 fL


(7.2-11.1) 


 


 


Neutrophils (%) (Auto)


  87.3 %


(39.4-72.5) 


 


 


Lymphocytes (%) (Auto)


  7.2 %


(17.6-49.6) 


 


 


Monocytes (%) (Auto)


  4.4 %


(4.1-12.4) 


 


 


Eosinophils (%) (Auto)


  0.1 %


(0.4-6.7) 


 


 


Basophils (%) (Auto)


  1.0 %


(0.3-1.4) 


 


 


Nucleated RBC Relative Count


(auto) 0.0 /100WBC 


  


 


 


Neutrophils # (Auto)


  8.4 K/uL


(2.0-7.4) 


 


 


Lymphocytes # (Auto)


  0.7 K/uL


(1.3-3.6) 


 


 


Monocytes # (Auto)


  0.4 K/uL


(0.3-1.0) 


 


 


Eosinophils # (Auto)


  0.0 K/uL


(0.0-0.5) 


 


 


Basophils # (Auto)


  0.1 K/uL


(0.0-0.1) 


 


 


Nucleated RBC Absolute Count


(auto) 0.00 K/uL 


  


 


 


Glomerular Filtration Rate


Calc 37.1 


  


 


 


Calcium Level


  9.8 mg/dl


(8.4-10.2) 


 


 


Total Bilirubin


  0.8 mg/dl


(0.2-1.3) 


 


 


Aspartate Amino Transf


(AST/SGOT) 47 U/L (0-35) 


  


 


 


Alanine Aminotransferase


(ALT/SGPT) 45 U/L (0-56) 


  


 


 


Alkaline Phosphatase


  115 U/L


(0-126) 


 


 


Total Protein


  7.8 g/dl


(6.3-8.2) 


 


 


Albumin


  4.3 g/dl


(3.5-5.0) 


 


 


Urine Color  Yellow 


 


Urine Clarity  Cloudy 


 


Urine pH


  


  7.0 pH


(4.8-9.5)


 


Urine Specific Gravity  1.015 


 


Urine Protein


  


  100 mg/dL


(NEGATIVE)


 


Urine Glucose (UA)


  


  Negative mg/dL


(NEGATIVE)


 


Urine Ketones


  


  20 mg/dL


(NEGATIVE)


 


Urine Blood


  


  Moderate


(NEGATIVE)


 


Urine Nitrite


  


  Positive


(NEGATIVE)


 


Urine Bilirubin


  


  Negative


(NEGATIVE)


 


Urine Urobilinogen


  


  Negative mg/dL


(0.2-1.9)


 


Urine Leukocyte Esterase


  


  Large


(NEGATIVE)


 


Urine RBC


  


  59 /HPF


(0-2/HPF)


 


Urine WBC


  


  328 /HPF


(0-5/HPF)


 


Urine Squamous Epithelial


Cells 


  None /LPF


(NONE-FEW)


 


Urine Bacteria


  


  Many /HPF


(NONE-FEW)


 


Urine Mucus


  


  None /HPF


(NONE-FEW)








Urinalysis








Test


  8/3/18


22:04


 


Urine Color Yellow 


 


Urine Clarity Cloudy 


 


Urine pH


  7.0 pH


(4.8-9.5)


 


Urine Specific Gravity 1.015 


 


Urine Protein


  100 mg/dL


(NEGATIVE)


 


Urine Glucose (UA)


  Negative mg/dL


(NEGATIVE)


 


Urine Ketones


  20 mg/dL


(NEGATIVE)


 


Urine Blood


  Moderate


(NEGATIVE)


 


Urine Nitrite


  Positive


(NEGATIVE)


 


Urine Bilirubin


  Negative


(NEGATIVE)


 


Urine Urobilinogen


  Negative mg/dL


(0.2-1.9)


 


Urine Leukocyte Esterase


  Large


(NEGATIVE)


 


Urine RBC


  59 /HPF


(0-2/HPF)


 


Urine WBC


  328 /HPF


(0-5/HPF)


 


Urine Squamous Epithelial


Cells None /LPF


(NONE-FEW)


 


Urine Bacteria


  Many /HPF


(NONE-FEW)


 


Urine Mucus


  None /HPF


(NONE-FEW)











EKG/Imaging


Imaging


INDICATION:  left flank pain, h/o stent


 


EXAM DATE:  8/3/2018 10:39 PM


 


COMPARISON: Fluoroscopic images and CT abdomen and pelvis 7/19/2018.


 


FINDINGS:


2 AP supine images of the abdomen.


 


There is a left ureteral stent in place with pigtails in the expected locations 

of the collecting system and urinary bladder.  Previously seen staghorn 

calculus in the left kidney is not well demonstrated.  Cholecystectomy clips 

noted.


 


Bowel gas pattern is nonobstructive. No pneumatosis, pneumoperitoneum or portal 

venous gas. No evidence of large volume ascites or mass.


 


No acute osseous abnormality.


 


 


IMPRESSION: Left ureteral stent appears grossly appropriately positioned, 

otherwise unremarkable.


 


Report Dictated By: Vaughn Carlson MD at 8/3/2018 11:24 PM





ED Course/Re-evaluation


Clinical Indication for ER IV:  Hydration, IV Access


ED Course


Pain improved with Dilaudid 1mg IV. She has a urinary tract infection. 

Discussed with Dr. Rodrigues. The patient was on Keflex recently and had hives 

with her Bactrim, so chose to start her on Levaquin at this time. She will 

follow-up as an outpatient.


Decision to Disposition Date:  Aug 3, 2018


Decision to Disposition Time:  23:39





Depart


Departure


Latest Vital Signs





Vital Signs








  Date Time  Temp Pulse Resp B/P (MAP) Pulse Ox O2 Delivery O2 Flow Rate FiO2


 


8/4/18 00:18     92 Room Air  


 


8/3/18 23:52    153/77 (102)    


 


8/3/18 23:40  98      


 


8/3/18 21:27       2 


 


8/3/18 20:14 97.9  24     








Impression:  


 Primary Impression:  


 Urinary tract infection


 Additional Impression:  


 S/P ureteral stent placement


Condition:  Improved


Disposition:  HOME OR SELF-CARE


Referrals:  


HIEU FERNANDO DO (PCP)


New Scripts


Levofloxacin 500 Mg Tab (LEVAQUIN 500 MG TAB) 500 Mg Tablet


500 MG PO QDAY, #10 TAB 0 Refills


   Prov: JAVY MARY MD         8/3/18 


Promethazine Hcl (PROMETHAZINE HCL) 25 Mg Tablet


25 MG PO Q8H Y for NAUSEA/VOMITING, #20 TAB 0 Refills


   Prov: JAVY MARY MD         8/3/18 


Oxycodone Hcl/Acetaminophen (PERCOCET 5-325 MG TABLET) 1 Each Tablet


1 EACH PO Q4H Y for PAIN, #12 TAB 0 Refills


   Prov: JAVY MARY MD         8/3/18


Patient Instructions:  Urinary Tract Infection in Women (ED)





Additional Instructions:  


Take Percocet 5/325, one every 4 hours as needed for pain.


Phenergan 25mg, 1/2 or 1 tablet every 8 hours as needed for nausea.





Levaquin 500mg once a day for 10 days.





Problem Qualifiers








 Primary Impression:  


 Urinary tract infection


 Urinary tract infection type:  site unspecified  Hematuria presence:  without 

hematuria  Qualified Codes:  N39.0 - Urinary tract infection, site not specified








JAVY MARY MD Aug 3, 2018 20:51

## 2018-08-10 ENCOUNTER — HOSPITAL ENCOUNTER (OUTPATIENT)
Dept: HOSPITAL 89 - CT | Age: 73
End: 2018-08-10
Attending: UROLOGY
Payer: MEDICARE

## 2018-08-10 DIAGNOSIS — K57.30: ICD-10-CM

## 2018-08-10 DIAGNOSIS — N20.0: Primary | ICD-10-CM

## 2018-08-10 DIAGNOSIS — M47.895: ICD-10-CM

## 2018-08-13 NOTE — RADIOLOGY IMAGING REPORT
FACILITY: [facility] 

 

PATIENT NAME: Mila Maria

: 1945

MR: [id number]

V: [id number]

EXAM DATE: 

ORDERING PHYSICIAN: KANIKA VARGHESE

TECHNOLOGIST: 

 

Location: [facility]

Patient: Mila Maria

: 1945

MRN: [id number]

Visit/Account:[id number]

Date of Sevice:  2018

 

ACCESSION #: [id number]

 

ABDOMEN PELVIS ESWL CYSTO W/O

 

HISTORY:  History of kidney stones

 

TECHNIQUE: Axial images acquired through the abdomen/pelvis.  Coronal and sagittal reformatting also 
performed.  No IV contrast administered.

 

COMPARISON:  2018

 

FINDINGS:

 

Visualized lung bases:  There is a 3 mm noncalcified nodule posterior aspect right lower lobe best se
en on image 36 of series 5 and appears unchanged there is an additional 2 mm nodule posterior aspect 
right lower lobe best seen on image 81 of series 5 also unchanged

 

Hepatobiliary:  Capsular calcifications are present along the right lobe of the liver appear unchange
d.  There are postsurgical changes from a cholecystectomy

 

Spleen:  Negative.

 

Adrenals:  There is nodular thickening of both adrenal glands similar to the prior study

 

Pancreas:  Negative.

 

Kidneys ureters and bladder: There is no evidence of right-sided urolithiasis hydronephrosis.  There 
is very mild fat stranding surrounding the right renal pelvis and proximal right ureter likely relate
d to the prior right ureteral stent which has been removed in the interim

 

There is now a left ureteral stent distal pigtail within the bladder and the proximal pigtail in the 
left renal pelvis.  There is no evidence of a left hydronephrosis.  Partial staghorn calculus in the 
upper pole of the left kidney appears smaller and more fragmented.  There is a 2 mm calculus lower po
le calyx the left kidney in addition to a 5 mm calculus also in the lower pole calyx of the left kidn
ey

 

Genitalia:  Coarse calcifications are again seen within the uterus likely representing fibroid.  Ther
e is an additional 2.5 cm mass projecting from the posterior left fundal region may represent an clement
tional fibroid

 

GI:  Diverticulosis left-sided colon although no CT evidence of acute diverticulitis.

 

Vessels/spaces/nodes:  Negative.

 

Bones/soft tissues:  Spondylotic changes of the thoracolumbar spine

 

Additional findings:  None pertinent.

 

IMPRESSION:

 

Previously noted partial staghorn calculus upper pole left kidney appears smaller and more fragmented
.  There is a 2 mm and 5 mm nonobstructing calculus lower pole calyx of the left kidney as well.  Lef
t ureteral stent appears to been good position without evidence of hydronephrosis

 

No evidence of right-sided urolithiasis or hydronephrosis

 

 

Additional chronic findings as described

 

Report Dictated By: Hedy Anderson MD at 2018 2:46 PM

 

Report E-Signed By: Hedy Anderson MD  at 2018 3:02 PM

 

AGGIEN:JOSE JUAN

## 2018-08-24 NOTE — HISTORY AND PHYSICAL
DATE OF ADMISSION:  August 27, 2018  



CHIEF COMPLAINT  

Left kidney stones with indwelling stent.  



HISTORY OF PRESENT ILLNESS 

Patient is a 72-year-old white female with a long history of kidney stones who 
recently presented to the Emergency Room in June with an obstructing right 
proximal stone.  At that time, she was also noted to have a 3 cm partial 
staghorn in the upper pole of the left side.  She was taken to the operating 
room on the 19th for definitive stone treatment after having a stent placed 
several weeks before.  At that time, her right stent was removed, and she had 
ureteroscopy on that side which was normal.  Therefore, a left stent was placed,
and she underwent extracorporeal shock wave lithotripsy of this left 3 x 1.5 cm 
stone.  In followup in the office, she was noted to have fairly good 
fragmentation of this stone into several smaller collections, primarily in the 
upper pole.  She is now being brought to the operating room for planned stent 
exchange and/or extracorporeal shock wave lithotripsy and/or ureteroscopy. 



PAST MEDICAL HISTORY 

1.  Hypertension.

2.  Anxiety and depression.

3.  Obesity.

4.  Asthma.

5.  Urinary incontinence.

6.  UTI.

7.  Pancreatitis.

8.  Kidney stones.



PAST SURGICAL HISTORY 

1.  Right double-J stent placement for proteus infection April 2011.

2.  Right ureteroscopy.

3.  Extracorporeal shock wave lithotripsy in May 2011.

4.  Laparoscopic cholecystectomy December 2011.

5.  Right double-J stent placement June 14, 2018.

6.  Right ureteroscopy with double-J stent on left and extracorporeal shock wave
lithotripsy of the left upper pole July 19, 2018.



ALLERGIES 

No known drug allergies.



CURRENT MEDICATIONS 

1.  Metoprolol.

2.  Procardia.

3.  Tylenol.

4.  Norco.

5.  Colace.

6.  Ditropan XL.

7.  Pyridium.  



SOCIAL HISTORY 

Patient is a  and lives in [place], [place].  



REVIEW OF SYSTEMS 

Patient denies productive cough, fever, chills, chest pain, nausea, vomiting, 
liver disease, or bleeding disorder.



PHYSICAL EXAMINATION 

GENERAL:  Patient is a moderately obese, white female in no acute distress. 

HEENT:  Normocephalic, atraumatic.  

CHEST:  Clear to auscultation bilaterally. 

CARDIOVASCULAR:  Regular rate and rhythm. 

ABDOMEN:  Soft, nontender.  No masses are palpated.  

GENITOURINARY:  Deferred to the OR.  

EXTREMITIES:  Without clubbing, cyanosis, or edema. 

NEUROLOGIC:  Nonfocal.



IMPRESSION 

A 72-year-old white female who is status post extracorporeal shock wave 
lithotripsy of a 3 x 1.5 cm upper pole stone on the left with indwelling stent 
placement.  She has recurrent or persistent fragments.  



PLAN 

Will plan to do stent exchange with possible ureteroscopy and followup 
extracorporeal shock wave lithotripsy as indicated.  

KACIE

## 2018-08-27 ENCOUNTER — HOSPITAL ENCOUNTER (OUTPATIENT)
Dept: HOSPITAL 89 - OR | Age: 73
Discharge: HOME | End: 2018-08-27
Attending: UROLOGY
Payer: MEDICARE

## 2018-08-27 VITALS — DIASTOLIC BLOOD PRESSURE: 96 MMHG | SYSTOLIC BLOOD PRESSURE: 164 MMHG

## 2018-08-27 VITALS — DIASTOLIC BLOOD PRESSURE: 100 MMHG | SYSTOLIC BLOOD PRESSURE: 176 MMHG

## 2018-08-27 VITALS — BODY MASS INDEX: 48.82 KG/M2 | HEIGHT: 65 IN | WEIGHT: 293 LBS

## 2018-08-27 VITALS — SYSTOLIC BLOOD PRESSURE: 179 MMHG | DIASTOLIC BLOOD PRESSURE: 111 MMHG

## 2018-08-27 VITALS — DIASTOLIC BLOOD PRESSURE: 105 MMHG | SYSTOLIC BLOOD PRESSURE: 162 MMHG

## 2018-08-27 VITALS — DIASTOLIC BLOOD PRESSURE: 116 MMHG | SYSTOLIC BLOOD PRESSURE: 173 MMHG

## 2018-08-27 VITALS — SYSTOLIC BLOOD PRESSURE: 178 MMHG | DIASTOLIC BLOOD PRESSURE: 100 MMHG

## 2018-08-27 VITALS — SYSTOLIC BLOOD PRESSURE: 158 MMHG | DIASTOLIC BLOOD PRESSURE: 87 MMHG

## 2018-08-27 VITALS — DIASTOLIC BLOOD PRESSURE: 94 MMHG | SYSTOLIC BLOOD PRESSURE: 169 MMHG

## 2018-08-27 VITALS — DIASTOLIC BLOOD PRESSURE: 99 MMHG | SYSTOLIC BLOOD PRESSURE: 162 MMHG

## 2018-08-27 DIAGNOSIS — N20.0: Primary | ICD-10-CM

## 2018-08-27 PROCEDURE — 81001 URINALYSIS AUTO W/SCOPE: CPT

## 2018-08-27 PROCEDURE — 50590 FRAGMENTING OF KIDNEY STONE: CPT

## 2018-08-27 PROCEDURE — 74176 CT ABD & PELVIS W/O CONTRAST: CPT

## 2018-08-27 PROCEDURE — 87088 URINE BACTERIA CULTURE: CPT

## 2018-08-27 NOTE — RADIOLOGY IMAGING REPORT
FACILITY: [facility] 

 

PATIENT NAME: Mila Maria

: 1945

MR: [id number]

V: [id number]

EXAM DATE: 

ORDERING PHYSICIAN: KANIKA VARGHESE

TECHNOLOGIST: 

 

Location: [facility]

Patient: Mila Maria

: 1945

MRN: [id number]

Visit/Account:[id number]

Date of Sevice:  2018

 

ACCESSION #: [id number]

 

CT abdomen pelvis without contrast

 

HISTORY: Kidney stones

 

TECHNIQUE:  CT abdomen and pelvis without intravenous contrast.  Contiguous axial images of the abdom
en and pelvis was performed from the lung bases to the symphysis pubis.

 

One of the following dose optimization techniques was utilized in the performance of this exam: Autom
ated exposure control; adjustment of the mA and/or kV according to the patient's size; or use of an i
terative  reconstruction technique.  Specific details can be referenced in the facility's radiology C
T exam operational policy.

 

CONTRAST:  None.

 

COMPARISON:  2018

 

FINDINGS:

 

Visualized lung bases:  Negative.

 

Hepatobiliary:  Calcification along the dome of the liver is unchanged and likely benign.  Gallbladde
r is absent.

 

Spleen:  Negative.

 

Adrenals:  Negative.

 

Kidneys/:  The left ureteral stent is unchanged in position but has migrated slightly distally.  Th
e proximal coil is in the UPJ and the distal coil is in the bladder.  No evidence for hydronephrosis 
on the left.

 

Fragmented stone superior pole left kidney measures 1 x 2.3 x 1 cm, unchanged.  Smaller nonobstructin
g stones lower pole of left kidney measure 5 and 6 mm respectively.

 

No visualized stones in the right kidney.

 

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

 

Some inflammation around the bladder suggests cystitis.

 

Calcifications in the uterus are noted.

 

Pancreas:  Negative.

 

GI:  Negative.

 

Vessels/spaces/nodes:  Atherosclerotic calcification is noted.

 

Bones/soft tissues:  Patient is osteopenic with degenerative changes.

 

IMPRESSION:

 

1.  Left ureteral stent is unchanged in position but has migrated slightly distally.  Proximal coil i
s in the UPJ and the distal coil is in the bladder.  No evidence for left-sided hydronephrosis.

 

2.  Fragmented stone superior pole left kidney is unchanged measures 2.3 x 1 x 1 cm.  Smaller nonobst
ructing stones are noted lower pole left kidney.

 

Report Dictated By: Thomas Dunphy, MD at 2018 11:04 AM

 

Report E-Signed By: Thomas Dunphy, MD  at 2018 11:15 AM

 

WSN:JOSE JUAN

## 2018-08-27 NOTE — OPERATIVE REPORT 1
EVENT DATE: August 27, 2018 

SURGEON: Hua Rodrigues MD 

ANESTHESIOLOGIST: Connor Almodovar MD 

ANESTHESIA: General. 





PREOPERATIVE DIAGNOSIS  

Left renal calculi with indwelling stent. 



POSTOPERATIVE DIAGNOSIS 

Left renal calculi with indwelling stent.  



PROCEDURE PERFORMED 

1.  Cystoscopy with left double-J stent removal and exchange. 

2.  Left extracorporeal shockwave lithotripsy. 



ESTIMATED BLOOD LOSS 

Minimal. 



IV FLUIDS 

Crystalloids. 



DRAINS

6 [place] x 26 cm contour stent on left.  



COMPLICATIONS 

None. 



CONDITION  

The patient taken to the recovery room awake and in stable condition. 



STATEMENT OF MEDICAL NECESSITY

The patient is a 72-year-old white female with a long history of kidney stones 
and infections who recently was noted to have a 3 cm partial staghorn calculus 
on the left upper pole, after presenting at that time with an obstructing right 
proximal stone.  The patient was subsequently taken to the operating room on the
19th of June for right ureteroscopy followed by a left stent placement with 
extracorporeal shockwave lithotripsy.  Follow up revealed a fairly good 
fragmentation of the stone with several smaller collections of the stone 
fragments mainly in the upper pole.  She is now being brought to the operating 
room for planned stent exchange with follow up lithotripsy.  



DESCRIPTION OF PROCEDURE 

Patient was brought to the operating room and after general anesthetic was 
obtained, she was placed in the dorsal lithotomy position and prepped and draped
in the usual sterile manner.  Anesthetic cystoscopy was performed using the 21 
[place] rigid sheath and 30 degree lens.  The stent was seen emanating from the 
left ureteral orifice.  It was grasped at its distal end and gently removed 
intact.  The scope was then reintroduced and a 6-[place] open ended access 
catheter was advanced up the left ureteral orifice to the mid ureter.  A 035 
wire was advanced in the lumen where it was curled in the upper pole calyx by 
fluoroscopic imaging.  The access catheter was removed.  The 6-[place] x 26 cm 
stent was placed up over the wire.  The wire was removed.  She was noted to have
good curling in the UPJ area by fluoroscopy and curling in the bladder by direct
vision. The patient's  bladder was drained through the cystoscopic sheath.  She 
was then positioned supine wherein two plane fluoroscopy was used to image the 
left kidney.  Our remaining stone fragment collection were mainly in the left 
upper pole.  After the cross hairs were placed on the medial aspect of the 
remaining stones, treatment was begun at a power setting of 2 and gradually 
increased to a power setting of 3 over the course of the first 300 shocks.  A 3 
minute pause was then performed and treatment resumed.  The power was gradually 
ramped up to a maximum power setting of 8 over the course of the 1000 shocks.  
She received a total of 3000 shocks to this upper pole stone collection.  At the
conclusion there were no further significant fragments could be identified.  
Intermittent two plane fluoroscopy was used to insure the cross hairs remained 
on the stones and stone fragment pile.  At the conclusion of the case, she was 
awakened in the operating room and taken to the recovery area in stable 
condition. 



PLAN

The plan will be to allow the patient to be discharged home today on Pyridium, 
Ditropan XL, Colace and Norco.  Will plan to have her return to the operating 
room in approximately 4 weeks time for stent removal with possible ureteroscopy 
as indicated.  

KACIE

## 2018-09-07 ENCOUNTER — HOSPITAL ENCOUNTER (OUTPATIENT)
Dept: HOSPITAL 89 - LAB | Age: 73
End: 2018-09-07
Attending: UROLOGY
Payer: MEDICARE

## 2018-09-07 ENCOUNTER — HOSPITAL ENCOUNTER (OUTPATIENT)
Dept: HOSPITAL 89 - ZZSENDIN | Age: 73
End: 2018-09-07
Attending: UROLOGY
Payer: MEDICARE

## 2018-09-07 DIAGNOSIS — N20.0: Primary | ICD-10-CM

## 2018-09-07 DIAGNOSIS — N39.0: ICD-10-CM

## 2018-09-07 DIAGNOSIS — Z02.9: Primary | ICD-10-CM

## 2018-09-07 PROCEDURE — 87186 SC STD MICRODIL/AGAR DIL: CPT

## 2018-09-07 PROCEDURE — 87088 URINE BACTERIA CULTURE: CPT

## 2018-09-07 PROCEDURE — 87077 CULTURE AEROBIC IDENTIFY: CPT

## 2018-09-11 ENCOUNTER — HOSPITAL ENCOUNTER (EMERGENCY)
Dept: HOSPITAL 89 - ER | Age: 73
Discharge: HOME | End: 2018-09-11
Payer: MEDICARE

## 2018-09-11 VITALS — DIASTOLIC BLOOD PRESSURE: 90 MMHG | SYSTOLIC BLOOD PRESSURE: 163 MMHG

## 2018-09-11 DIAGNOSIS — E86.0: ICD-10-CM

## 2018-09-11 DIAGNOSIS — N39.0: Primary | ICD-10-CM

## 2018-09-11 LAB — PLATELET COUNT, AUTOMATED: 200 K/UL (ref 150–450)

## 2018-09-11 PROCEDURE — 82247 BILIRUBIN TOTAL: CPT

## 2018-09-11 PROCEDURE — 84460 ALANINE AMINO (ALT) (SGPT): CPT

## 2018-09-11 PROCEDURE — 74176 CT ABD & PELVIS W/O CONTRAST: CPT

## 2018-09-11 PROCEDURE — 84520 ASSAY OF UREA NITROGEN: CPT

## 2018-09-11 PROCEDURE — 96361 HYDRATE IV INFUSION ADD-ON: CPT

## 2018-09-11 PROCEDURE — 84295 ASSAY OF SERUM SODIUM: CPT

## 2018-09-11 PROCEDURE — 87088 URINE BACTERIA CULTURE: CPT

## 2018-09-11 PROCEDURE — 84155 ASSAY OF PROTEIN SERUM: CPT

## 2018-09-11 PROCEDURE — 83690 ASSAY OF LIPASE: CPT

## 2018-09-11 PROCEDURE — 96374 THER/PROPH/DIAG INJ IV PUSH: CPT

## 2018-09-11 PROCEDURE — 99284 EMERGENCY DEPT VISIT MOD MDM: CPT

## 2018-09-11 PROCEDURE — 84450 TRANSFERASE (AST) (SGOT): CPT

## 2018-09-11 PROCEDURE — 82947 ASSAY GLUCOSE BLOOD QUANT: CPT

## 2018-09-11 PROCEDURE — 96375 TX/PRO/DX INJ NEW DRUG ADDON: CPT

## 2018-09-11 PROCEDURE — 82310 ASSAY OF CALCIUM: CPT

## 2018-09-11 PROCEDURE — 84075 ASSAY ALKALINE PHOSPHATASE: CPT

## 2018-09-11 PROCEDURE — 82435 ASSAY OF BLOOD CHLORIDE: CPT

## 2018-09-11 PROCEDURE — 81001 URINALYSIS AUTO W/SCOPE: CPT

## 2018-09-11 PROCEDURE — 82040 ASSAY OF SERUM ALBUMIN: CPT

## 2018-09-11 PROCEDURE — 82374 ASSAY BLOOD CARBON DIOXIDE: CPT

## 2018-09-11 PROCEDURE — 85025 COMPLETE CBC W/AUTO DIFF WBC: CPT

## 2018-09-11 PROCEDURE — 82565 ASSAY OF CREATININE: CPT

## 2018-09-11 PROCEDURE — 84132 ASSAY OF SERUM POTASSIUM: CPT

## 2018-09-11 NOTE — ER REPORT
History and Physical


Time Seen By MD:  09:49


HPI/ROS


CHIEF COMPLAINT: Bilateral flank pain, suprapubic pain





HISTORY OF PRESENT ILLNESS: Patient is a 72-year-old female here with complaints


of bilateral flank pain, suprapubic pain on current treatment for urinary tract 


infection with Bactrim and previously treated with another antibiotic that the 


patient is unable to recall. Patient reports having prior history of 


nephrolithiasis requiring lithotripsy and stents with a current stent in the 


left ureter. Patient is afebrile, hemodynamically stable. Patient denies chest 


pain, shortness of breath, headache, blurred vision, blood in the stools.





REVIEW OF SYSTEMS:


Constitutional: No fever, no chills.


Eyes: No discharge.


ENT: No sore throat. 


Cardiovascular: No chest pain, no palpitations.


Respiratory: No cough, no shortness of breath.


Gastrointestinal: suprapubic abdominal pain, no vomiting.


Genitourinary: No hematuria.


Musculoskeletal: + b/l flank pain


Skin: No rashes.


Neurological: No headache.


Allergies:  


Coded Allergies:  


     Sulfa (Sulfonamide Antibiotics) (Verified  Allergy, Unknown, HIVES, 


9/11/18)


Home Meds


Active Scripts


Promethazine Hcl (PROMETHAZINE HCL) 25 Mg Tablet, 25 MG PO Q8H PRN for 


NAUSEA/VOMITING, #20 TAB 0 Refills


   Prov:JAVY MARY MD         8/3/18


Nifedipine (PROCARDIA XL) 30 Mg Tab.er.24, 30 MG PO QDAY, #30 TAB


   Prov:RIP GARCIA DO         6/17/18


Reported Medications


Trimethoprim (TRIMETHOPRIM) 100 Mg Tab, 100 MG FT, TAB


   9/11/18


Docusate Sodium (COLACE) 100 Mg Capsule, 100 MG PO BID for STOOL SOFTENER, #30 


CAPSULE


   8/27/18


Phenazopyridine Hcl (PHENAZOPYRIDINE HCL) 200 Mg Tablet, 200 MG PO TID PRN for 


BURNING WITH URINATION, TAB


   8/27/18


Oxybutynin Chloride (DITROPAN XL) 10 Mg Tab.er.24, 10 MG PO QDAY PRN for 


URGENCY, #20 TAB


   8/27/18


Hydrocodone Bit/Acetaminophen (NORCO 5-325 TABLET) 1 Each Tablet, 1 EACH PO Q6H 


PRN for PAIN, #40 TAB


   8/27/18


Chlorpheniramine/Dextromethorp (Eql Cough-Cold Relief Hbp Tab) 4 Mg-30 Mg 


Tablet, 1 TAB PO PRN PRN for CONGESTION


   8/21/18


Docusate Sodium (COLACE) 100 Mg Capsule, 100 MG PO PRN, #30 CAPSULE


   7/19/18


Metoprolol Tartrate (Metoprolol Tartrate) 100 Mg Tablet, 100 MG PO BID, #60 0 


Refills


   5/18/11


Discontinued Reported Medications


Cephalexin 500 Mg Tab (KEFLEX 500 MG TAB) 500 Mg Tablet, 500 MG PO TID for A


NTIBIOTIC, #9 TAB


   8/27/18


Nitrofurantoin Monohyd/M-Cryst (NITROFURANTOIN MONO- MG) 100 Mg Capsule, 


100 MG PO DAILY for 9 Days, CAPSULE


   8/21/18


Hx Smoking:  No


Smoking Status:  Never Smoker


Hx Substance Use Disorder:  No


Hx Alcohol Use:  No


Constitutional





Vital Sign - Last 24 Hours








 9/11/18 9/11/18 9/11/18 9/11/18





 09:42 09:51 09:52 09:57


 


Temp  98.3  


 


Pulse ??? 72  68


 


Resp  20  


 


B/P (MAP)  155/98 155/98 (117) 


 


Pulse Ox    96


 


O2 Delivery  Room Air  


 


    





 9/11/18 9/11/18 9/11/18 9/11/18





 10:00 10:12 10:27 10:30


 


Pulse  64 58 


 


B/P (MAP) 166/112 (130)   152/90 (110)


 


Pulse Ox  95 93 





 9/11/18 9/11/18 9/11/18 9/11/18





 10:50 11:00 11:05 11:20


 


Pulse ???  60 63


 


B/P (MAP)  127/112 (117)  


 


Pulse Ox   95 96





 9/11/18 9/11/18 9/11/18 9/11/18





 11:30 11:35 11:50 12:00


 


Pulse  50 63 


 


B/P (MAP) 144/87 (106)   163/90 (114)


 


Pulse Ox  95 94 





 9/11/18 9/11/18  





 12:05 12:20  


 


Pulse 62 66  


 


Pulse Ox 94 94  








Physical Exam


   General Appearance: The patient is alert, has no immediate need for airway 


protection and no signs of toxicity. No acute distress


Eyes: Pupils equal and round no pallor or injection.


ENT, Mouth: Mucous membranes are moist.


Respiratory: There are no retractions, lungs are clear to auscultation.


Cardiovascular: Regular rate and rhythm. 


Gastrointestinal:  + mild suprapubic tenderness, no masses, bowel sounds normal.


Neurological: No focal deficits


Skin: Warm and dry, no rashes.


Musculoskeletal:  Neck is supple non tender, + b/l mild flank pain.


      Extremities are nontender, nonswollen and have full range of motion.





DIFFERENTIAL DIAGNOSIS: After history and physical exam differential diagnosis 


was considered for abdominal pain including but not limited to appendicitis, 


cholecystitis, gastritis and urinary tract infection.





Medical Decision Making


Data Points


Result Diagram:  


9/11/18 1008                                                                    


           9/11/18 1008





Laboratory





Hematology








Test


 9/11/18


10:08 9/11/18


10:15


 


Red Blood Count


 4.67 M/uL


(4.17-5.56) 





 


Mean Corpuscular Volume


 89.8 fL


(80.0-96.0) 





 


Mean Corpuscular Hemoglobin


 30.2 pg


(26.0-33.0) 





 


Mean Corpuscular Hemoglobin


Concent 33.7 g/dL


(32.0-36.0) 





 


Red Cell Distribution Width


 16.1 %


(11.5-14.5) 





 


Mean Platelet Volume


 8.8 fL


(7.2-11.1) 





 


Neutrophils (%) (Auto)


 75.2 %


(39.4-72.5) 





 


Lymphocytes (%) (Auto)


 14.8 %


(17.6-49.6) 





 


Monocytes (%) (Auto)


 7.3 %


(4.1-12.4) 





 


Eosinophils (%) (Auto)


 1.9 %


(0.4-6.7) 





 


Basophils (%) (Auto)


 0.8 %


(0.3-1.4) 





 


Nucleated RBC Relative Count


(auto) 0.1 /100WBC 


 





 


Neutrophils # (Auto)


 3.9 K/uL


(2.0-7.4) 





 


Lymphocytes # (Auto)


 0.8 K/uL


(1.3-3.6) 





 


Monocytes # (Auto)


 0.4 K/uL


(0.3-1.0) 





 


Eosinophils # (Auto)


 0.1 K/uL


(0.0-0.5) 





 


Basophils # (Auto)


 0.0 K/uL


(0.0-0.1) 





 


Nucleated RBC Absolute Count


(auto) 0.00 K/uL 


 





 


Sodium Level


 140 mmol/L


(137-145) 





 


Potassium Level


 4.0 mmol/L


(3.5-5.0) 





 


Chloride Level


 108 mmol/L


() 





 


Carbon Dioxide Level


 18 mmol/L


(22-31) 





 


Blood Urea Nitrogen


 19 mg/dl


(7-18) 





 


Creatinine


 1.30 mg/dl


(0.52-1.04) 





 


Glomerular Filtration Rate


Calc 40.4 


 





 


Random Glucose


 129 mg/dl


() 





 


Calcium Level


 10.0 mg/dl


(8.4-10.2) 





 


Total Bilirubin


 0.8 mg/dl


(0.2-1.3) 





 


Aspartate Amino Transf


(AST/SGOT) 49 U/L (0-35) 


 





 


Alanine Aminotransferase


(ALT/SGPT) 49 U/L (0-56) 


 





 


Alkaline Phosphatase 89 U/L (0-126)  


 


Total Protein


 7.6 g/dl


(6.3-8.2) 





 


Albumin


 4.1 g/dl


(3.5-5.0) 





 


Lipase


 103 U/L


() 





 


Urine Color  Christa 


 


Urine Clarity  Clear 


 


Urine pH


 


 7.0 pH


(4.8-9.5)


 


Urine Specific Gravity  1.010 


 


Urine Protein


 


 Negative mg/dL


(NEGATIVE)


 


Urine Glucose (UA)


 


 Negative mg/dL


(NEGATIVE)


 


Urine Ketones


 


 Negative mg/dL


(NEGATIVE)


 


Urine Blood


 


 Moderate


(NEGATIVE)


 


Urine Nitrite


 


 Positive


(NEGATIVE)


 


Urine Bilirubin


 


 Negative


(NEGATIVE)


 


Urine Urobilinogen


 


 4.0 mg/dL


(0.2-1.9)


 


Urine Leukocyte Esterase


 


 Moderate


(NEGATIVE)


 


Urine RBC


 


 48 /HPF


(0-2/HPF)


 


Urine WBC


 


 44 /HPF


(0-5/HPF)


 


Urine Squamous Epithelial


Cells 


 Many /LPF


(NONE-FEW)


 


Urine Transitional Epithelial


Cells 


 Few /LPF


(NONE-FEW)


 


Urine Bacteria


 


 Few /HPF


(NONE-FEW)


 


Urine Mucus


 


 Few /HPF


(NONE-FEW)








Chemistry








Test


 9/11/18


10:08 9/11/18


10:15


 


White Blood Count


 5.1 k/uL


(4.5-11.0) 





 


Red Blood Count


 4.67 M/uL


(4.17-5.56) 





 


Hemoglobin


 14.1 g/dL


(12.0-16.0) 





 


Hematocrit


 42.0 %


(34.0-47.0) 





 


Mean Corpuscular Volume


 89.8 fL


(80.0-96.0) 





 


Mean Corpuscular Hemoglobin


 30.2 pg


(26.0-33.0) 





 


Mean Corpuscular Hemoglobin


Concent 33.7 g/dL


(32.0-36.0) 





 


Red Cell Distribution Width


 16.1 %


(11.5-14.5) 





 


Platelet Count


 200 K/uL


(150-450) 





 


Mean Platelet Volume


 8.8 fL


(7.2-11.1) 





 


Neutrophils (%) (Auto)


 75.2 %


(39.4-72.5) 





 


Lymphocytes (%) (Auto)


 14.8 %


(17.6-49.6) 





 


Monocytes (%) (Auto)


 7.3 %


(4.1-12.4) 





 


Eosinophils (%) (Auto)


 1.9 %


(0.4-6.7) 





 


Basophils (%) (Auto)


 0.8 %


(0.3-1.4) 





 


Nucleated RBC Relative Count


(auto) 0.1 /100WBC 


 





 


Neutrophils # (Auto)


 3.9 K/uL


(2.0-7.4) 





 


Lymphocytes # (Auto)


 0.8 K/uL


(1.3-3.6) 





 


Monocytes # (Auto)


 0.4 K/uL


(0.3-1.0) 





 


Eosinophils # (Auto)


 0.1 K/uL


(0.0-0.5) 





 


Basophils # (Auto)


 0.0 K/uL


(0.0-0.1) 





 


Nucleated RBC Absolute Count


(auto) 0.00 K/uL 


 





 


Glomerular Filtration Rate


Calc 40.4 


 





 


Calcium Level


 10.0 mg/dl


(8.4-10.2) 





 


Total Bilirubin


 0.8 mg/dl


(0.2-1.3) 





 


Aspartate Amino Transf


(AST/SGOT) 49 U/L (0-35) 


 





 


Alanine Aminotransferase


(ALT/SGPT) 49 U/L (0-56) 


 





 


Alkaline Phosphatase 89 U/L (0-126)  


 


Total Protein


 7.6 g/dl


(6.3-8.2) 





 


Albumin


 4.1 g/dl


(3.5-5.0) 





 


Lipase


 103 U/L


() 





 


Urine Color  Christa 


 


Urine Clarity  Clear 


 


Urine pH


 


 7.0 pH


(4.8-9.5)


 


Urine Specific Gravity  1.010 


 


Urine Protein


 


 Negative mg/dL


(NEGATIVE)


 


Urine Glucose (UA)


 


 Negative mg/dL


(NEGATIVE)


 


Urine Ketones


 


 Negative mg/dL


(NEGATIVE)


 


Urine Blood


 


 Moderate


(NEGATIVE)


 


Urine Nitrite


 


 Positive


(NEGATIVE)


 


Urine Bilirubin


 


 Negative


(NEGATIVE)


 


Urine Urobilinogen


 


 4.0 mg/dL


(0.2-1.9)


 


Urine Leukocyte Esterase


 


 Moderate


(NEGATIVE)


 


Urine RBC


 


 48 /HPF


(0-2/HPF)


 


Urine WBC


 


 44 /HPF


(0-5/HPF)


 


Urine Squamous Epithelial


Cells 


 Many /LPF


(NONE-FEW)


 


Urine Transitional Epithelial


Cells 


 Few /LPF


(NONE-FEW)


 


Urine Bacteria


 


 Few /HPF


(NONE-FEW)


 


Urine Mucus


 


 Few /HPF


(NONE-FEW)








Urinalysis








Test


 9/11/18


10:15


 


Urine Color Christa 


 


Urine Clarity Clear 


 


Urine pH


 7.0 pH


(4.8-9.5)


 


Urine Specific Gravity 1.010 


 


Urine Protein


 Negative mg/dL


(NEGATIVE)


 


Urine Glucose (UA)


 Negative mg/dL


(NEGATIVE)


 


Urine Ketones


 Negative mg/dL


(NEGATIVE)


 


Urine Blood


 Moderate


(NEGATIVE)


 


Urine Nitrite


 Positive


(NEGATIVE)


 


Urine Bilirubin


 Negative


(NEGATIVE)


 


Urine Urobilinogen


 4.0 mg/dL


(0.2-1.9)


 


Urine Leukocyte Esterase


 Moderate


(NEGATIVE)


 


Urine RBC


 48 /HPF


(0-2/HPF)


 


Urine WBC


 44 /HPF


(0-5/HPF)


 


Urine Squamous Epithelial


Cells Many /LPF


(NONE-FEW)


 


Urine Transitional Epithelial


Cells Few /LPF


(NONE-FEW)


 


Urine Bacteria


 Few /HPF


(NONE-FEW)


 


Urine Mucus


 Few /HPF


(NONE-FEW)











EKG/Imaging


Imaging


ACCESSION #: [id number]


 


ABDOMEN/PELVIS W/O CONTRAST


 


HISTORY:  Flank pain and bilateral suprapubic pain.


 


TECHNIQUE:  CT abdomen and pelvis without intravenous contrast.


 


One of the following dose optimization techniques was utilized in the 


performance of this exam: Automated exposure control; adjustment of the mA 


and/or kV according to the patient's size; or use of an iterative  


reconstruction technique.  Specific details can be referenced in the facility's 


radiology CT exam operational policy.


 


CONTRAST:  None. Please note, lack of IV contrast limits evaluation of solid 


organs.


 


COMPARISON:  CT dated August 27, 2018


 


FINDINGS:


 


Visualized lung bases:  2 mm nodule within the right lower lobe, not 


significantly changed since 2011 and benign given chronicity.


 


Hepatobiliary:  Liver is enlarged measuring 18.3 cm at the midclavicular line.  


At least mild hepatic steatosis.  Otherwise negative.  Benign-appearing 


dystrophic calcifications along the periphery of the right hepatic lobe.  


Gallbladder surgically absent.


 


Spleen:  Negative.


 


Adrenals:  Mild benign-appearing adrenal thickening.


 


Pancreas:  Mild/moderate pancreatic atrophy.


 


Kidneys/:  Double-J left nephroureteral stent on the left which is in 


satisfactory position.  Decrease in stone burden within the left kidney.  Persis


tent heterogeneous nonobstructing calculi on the left.  Subcentimeter 


hypodensity within the left kidney which is too small to characterize however 


statistically represents a simple cyst.  No right-sided nephrolithiasis.  No 


hydronephrosis.  Subtle heterogeneous calcification within the uterus, likely 


related to an underlying small uterine fibroid.


 


GI:  Mild descending and sigmoid colon diverticulosis without evidence for 


diverticulitis.


 


Vessels/spaces/nodes:  Negative.


 


Bones/soft tissues:  Small fat-containing umbilical hernia which also contains a


dystrophic calcification.  Degenerative changes within the lumbar spine.


 


IMPRESSION:


 


1.  No acute findings.


2.  Interval placement of a double-J left nephroureteral stent with decreased 


nonobstructing stone burden and the left kidney.  No hydronephrosis.


3.  Additional incidental/chronic findings, as above.





ED Course/Re-evaluation


ED Course


Patient is a 72-year-old female here with complaints of bilateral flank pain, 


suprapubic pain with history significant for nephrolithiasis and previous stent 


placement currently on treatment for urinary tract infection which was switched 


to trimethoprim. Patient complains of general malaise, nausea and weakness and 


dehydration. Labs were remarkable for CKG which was stable compared to prior lab


findings. Urinalysis showed positive nitrates and positive leuk esterase. 


Patient was treated with ceftriaxone for urinary tract symptoms and urinalysis 


findings. CT imaging showed no acute findings. Patient was also given fluid 


bolus of normal saline for clinical findings of mild dehydration. I updated the 


patient regarding these findings and advised her to follow-up with her PCP in 


the next several days. Patient reports that she has an appointment tomorrow. 


Patient was stable at time of discharge.


Decision to Disposition Date:  Sep 11, 2018


Decision to Disposition Time:  12:19





Depart


Departure


Latest Vital Signs





Vital Signs








  Date Time  Temp Pulse Resp B/P (MAP) Pulse Ox O2 Delivery O2 Flow Rate FiO2


 


9/11/18 12:20  66   94   


 


9/11/18 12:00    163/90 (114)    


 


9/11/18 09:51 98.3  20   Room Air  








Impression:  


   Primary Impression:  


   Urinary tract infection


Condition:  Improved


Disposition:  HOME OR SELF-CARE


Referrals:  


HIEU FERNANDO DO (PCP)


Patient Instructions:  Sepsis (GEN), Urinary Tract Infection in Women (ED)





Additional Instructions:  


Please follow-up with her family doctor in the next day. Please return promptly 


if you develop fevers, chills, weakness, decreased oral intake, difficulty 


urinating. You received ceftriaxone today for treatment of urinary tract in


fection, continue to take her trimethoprim as prescribed by your doctor











EMIL CARDOSO DO           Sep 11, 2018 09:49

## 2018-09-11 NOTE — RADIOLOGY IMAGING REPORT
[place] [facility] [place] 

 

PATIENT NAME: Mila Maria

: 1945

MR: [id number]

V: [id number]

EXAM DATE: 

ORDERING PHYSICIAN: EMIL CARDOSO

TECHNOLOGIST: 

 

Location: [facility] [place]

Patient: Mila Maria

: 1945

MRN: [id number]

Visit/Account:[id number]

Date of Sevice:  2018

 

ACCESSION #: [id number]

 

ABDOMEN/PELVIS W/O CONTRAST

 

HISTORY:  Flank pain and bilateral suprapubic pain.

 

TECHNIQUE:  CT abdomen and pelvis without intravenous contrast.

 

One of the following dose optimization techniques was utilized in the performance of this exam: Autom
ated exposure control; adjustment of the mA and/or kV according to the patient's size; or use of an i
terative  reconstruction technique.  Specific details can be referenced in the facility's radiology C
T exam operational policy.

 

CONTRAST:  None. Please note, lack of IV contrast limits evaluation of solid organs.

 

COMPARISON:  CT dated 2018

 

FINDINGS:

 

Visualized lung bases:  2 mm nodule within the right lower lobe, not significantly changed since 
 and benign given chronicity.

 

Hepatobiliary:  Liver is enlarged measuring 18.3 cm at the midclavicular line.  At least mild hepatic
 steatosis.  Otherwise negative.  Benign-appearing dystrophic calcifications along the periphery of t
he right hepatic lobe.  Gallbladder surgically absent.

 

Spleen:  Negative.

 

Adrenals:  Mild benign-appearing adrenal thickening.

 

Pancreas:  Mild/moderate pancreatic atrophy.

 

Kidneys/:  Double-J left nephroureteral stent on the left which is in satisfactory position.  Decre
ase in stone burden within the left kidney.  Persistent heterogeneous nonobstructing calculi on the l
eft.  Subcentimeter hypodensity within the left kidney which is too small to characterize however sta
tistically represents a simple cyst.  No right-sided nephrolithiasis.  No hydronephrosis.  Subtle het
erogeneous calcification within the uterus, likely related to an underlying small uterine fibroid.

 

GI:  Mild descending and sigmoid colon diverticulosis without evidence for diverticulitis.

 

Vessels/spaces/nodes:  Negative.

 

Bones/soft tissues:  Small fat-containing umbilical hernia which also contains a dystrophic calcifica
tion.  Degenerative changes within the lumbar spine.

 

IMPRESSION:

 

1.  No acute findings.

2.  Interval placement of a double-J left nephroureteral stent with decreased nonobstructing stone bu
rden and the left kidney.  No hydronephrosis.

3.  Additional incidental/chronic findings, as above.

 

Report Dictated By: Felix Mota MD at 2018 11:09 AM

 

Report E-Signed By: Felix Mota MD  at 2018 11:16 AM

 

WSN:[id number]

## 2018-10-18 ENCOUNTER — HOSPITAL ENCOUNTER (OUTPATIENT)
Dept: HOSPITAL 89 - OR | Age: 73
Discharge: HOME | End: 2018-10-18
Attending: UROLOGY
Payer: MEDICARE

## 2018-10-18 VITALS — WEIGHT: 293 LBS | HEIGHT: 64.5 IN | BODY MASS INDEX: 49.41 KG/M2

## 2018-10-18 VITALS — DIASTOLIC BLOOD PRESSURE: 86 MMHG | SYSTOLIC BLOOD PRESSURE: 153 MMHG

## 2018-10-18 VITALS — SYSTOLIC BLOOD PRESSURE: 161 MMHG | DIASTOLIC BLOOD PRESSURE: 95 MMHG

## 2018-10-18 VITALS — DIASTOLIC BLOOD PRESSURE: 73 MMHG | SYSTOLIC BLOOD PRESSURE: 148 MMHG

## 2018-10-18 VITALS — SYSTOLIC BLOOD PRESSURE: 152 MMHG | DIASTOLIC BLOOD PRESSURE: 93 MMHG

## 2018-10-18 DIAGNOSIS — N20.0: Primary | ICD-10-CM

## 2018-10-18 PROCEDURE — 87088 URINE BACTERIA CULTURE: CPT

## 2018-10-18 PROCEDURE — 81001 URINALYSIS AUTO W/SCOPE: CPT

## 2018-10-18 PROCEDURE — 50590 FRAGMENTING OF KIDNEY STONE: CPT

## 2018-10-18 PROCEDURE — 74018 RADEX ABDOMEN 1 VIEW: CPT

## 2018-10-18 NOTE — OPERATIVE REPORT 1
EVENT DATE:  October 18, 2018 

SURGEON:  Benedicto Mina MD 

ANESTHESIOLOGIST:  Connor Almodovar MD 

ANESTHESIA:  General anesthetic.





PREOPERATIVE DIAGNOSES  

1.  Left renal lithiasis.

2.  Chronic left indwelling ureteral stent.  



POSTOPERATIVE DIAGNOSES 

1.  Left renal lithiasis.

2.  Chronic left indwelling ureteral stent.



PROCEDURES PERFORMED 

1.  Cystourethroscopy.

2.  Removal of indwelling left ureteral stent.

3.  Placement of external stent and subsequent removal.

4.  Left extracorporeal shock wave lithotripsy of two stone foci.



DESCRIPTION OF PROCEDURE 

Under general anesthetic, the patient was prepped and draped in a standard 

lithotomy position.  



The 21 panendoscope admitted through the urethra into the bladder.  



Urine was obtained for culture and sensitivity.  



The left indwelling stent was visualized and removed.  



The external stent 6 whistle tip was passed up the left collecting system and 

secured to a catheter and contrast medium.  



After localization, the multiple stones in the upper pole area of the left 

kidney were treated with a total of 2500 shocks progressing from low to high kV 

fairly rapidly.  



The patient was repositioned for extracorporeal shock wave lithotripsy of the 

calculi located in the inferior pole of the left kidney.  



The inferior pole stone was treated with a total of 500 shocks progressing from 

low to high kV fairly rapidly.  



At the conclusion of the procedure, the external stent and Le were removed.



Patient tolerated the procedure satisfactorily and returned to the recovery room

in satisfactory condition.



This is a 72-year-old white female with left renal lithiasis.  She reportedly 

has had a large left staghorn calculus treated previously by Dr. Rodrigues with 

multiple treatments.



His treatment shows significant resolution.  As previously alluded, her staghorn

calculus/infected stone appeared to be significantly resolved, but still had 

significant residual lithiasis as well.  



The patient was adamant about treatment and stent removal.  



I advised patient as to possible problems with Steinstrasse and obstructive 

uropathy.  She seemed to understand and is willing to take the risk.



The patient had been covered with two antibiotics intraoperatively and will be 

discharged home on antibiotic therapy, Cipro.



Current urine culture is pending.



Plan followup in the office this coming Tuesday, the 23rd of October 2018.  She 

is to call for an appointment.  Activities are as tolerated.  She is to continue

to use the medications.  A copy of instructions was given to the patient.



She is to strain all her urine.  She was sent home with strainers.  



Patient to percuss her left kidney frequently to facilitate passage of stone 

particles.  Copy of instructions for renal percussion was also given to the 

patient.  



Patient will be discharged home on Cipro, Motrin, Pepcid, and Norco therapy, in 

addition to her usual medications.  

MTDD

## 2018-10-26 ENCOUNTER — HOSPITAL ENCOUNTER (EMERGENCY)
Dept: HOSPITAL 89 - ER | Age: 73
Discharge: HOME | End: 2018-10-26
Payer: MEDICARE

## 2018-10-26 VITALS — SYSTOLIC BLOOD PRESSURE: 160 MMHG | DIASTOLIC BLOOD PRESSURE: 104 MMHG

## 2018-10-26 DIAGNOSIS — B02.9: Primary | ICD-10-CM

## 2018-10-26 PROCEDURE — 99283 EMERGENCY DEPT VISIT LOW MDM: CPT

## 2018-10-26 NOTE — ER REPORT
History and Physical


Time Seen By MD:  11:22


Hx. of Stated Complaint:  


PATIENT REPORTS THAT HER HEAD AND NODES FEEL SWOLLEN


HPI/ROS


CHIEF COMPLAINT: Enlarged lymph nodes, pain and swelling to the scalp





HISTORY OF PRESENT ILLNESS: 72-year-old female patient presents to emergency 


room with complaint of enlarged lymph nodes, pain and swelling to the scalp. 


Patient states this started approximately Friday last week. She states that it 


is continued. She states that she felt like she had scabs on her scalp. She 


states they're very tender, she states she has a hard time sleeping. She denies 


any fevers, chills, nausea, vomiting or diarrhea. Patient states she is not 


taking any medication for this. Patient states she had enlarged lymph nodes 


behind her ear. She states that she does have rashes developed on her chin and 


her neck.





REVIEW OF SYSTEMS:


Respiratory: No cough, no dyspnea.


Cardiovascular: No chest pain, no palpitations.


Gastrointestinal: No vomiting, no abdominal pain.


Musculoskeletal: No back pain.


Allergies:  


Coded Allergies:  


     latex (Verified  Allergy, Severe, RASH, 10/15/18)


   


   PEELS SKIN OFF


     Sulfa (Sulfonamide Antibiotics) (Verified  Allergy, Unknown, HIVES, 


9/11/18)


Home Meds


Active Scripts


Hydrocodone Bit/Acetaminophen (HYDROCODON-ACETAMINOPHEN 5-325) 1 Each Tablet, 1 


EACH PO Q4-6H PRN for PAIN, #12 TAB


   Prov:CHLOE BAKER [facility]         10/26/18


Valacyclovir Hcl (VALTREX) 1,000 Mg Tablet, 1000 MG PO TID, #20 TAB


   Prov:CHLOE BAKER         10/26/18


Promethazine Hcl (PROMETHAZINE HCL) 25 Mg Tablet, 25 MG PO Q8H PRN for 


NAUSEA/VOMITING, #20 TAB 0 Refills


   Prov:JAVY MARY MD         8/3/18


Nifedipine (PROCARDIA XL) 30 Mg Tab.er.24, 30 MG PO QDAY, #30 TAB


   Prov:RIP GARCIA DO         6/17/18


Reported Medications


Hydrocodone Bit/Acetaminophen (NORCO 7.5-325 TABLET) 1 Each Tablet, 1 EACH PO 


Q4-6H PRN for PAIN, #30


   10/18/18


Ibuprofen (IBUPROFEN) 800 Mg Tablet, 1 TAB PO TID PRN for PAIN, #50 TAB


   10/18/18


Famotidine (FAMOTIDINE) 20 Mg Tablet, 20 MG PO BID, #20 TAB


   10/18/18


Ciprofloxacin Hcl (CIPROFLOXACIN HCL) 500 Mg Tablet, 500 MG PO Q12H for 10 Days,


#20 TAB


   10/18/18


Hydroxyzine Hcl (HYDROXYZINE HCL) 50 Mg Tablet, 50 MG PO TID PRN for ANXIETY


   10/18/18


Solifenacin Succinate (VESICARE) 10 Mg Tablet, 10 MG PO QDAY


   10/15/18


Phenazopyridine Hcl (PHENAZOPYRIDINE HCL) 200 Mg Tablet, 200 MG PO TID PRN for 


BURNING WITH URINATION, TAB


   8/27/18


Chlorpheniramine/Dextromethorp (Eql Cough-Cold Relief Hbp Tab) 4 Mg-30 Mg 


Tablet, 1 TAB PO PRN PRN for CONGESTION


   8/21/18


Docusate Sodium (COLACE) 100 Mg Capsule, 100 MG PO PRN, #30 CAPSULE


   7/19/18


Metoprolol Tartrate (Metoprolol Tartrate) 100 Mg Tablet, 100 MG PO BID, #60 0 


Refills


   5/18/11


Past Medical/Surgical History


Patient has a past medical history of hypertension, oxygen at night, 


cholecystitis, urinary incontinence, frequent UTI, arthritis, degenerative disc 


disease, sensitive skin, anxiety.


Patient has surgical history of cholecystectomy, ESWL, cystoscopy right stent 


placement.


Reviewed Nurses Notes:  Yes


Hx Smoking:  No


Smoking Status:  Never Smoker


Hx Substance Use Disorder:  No


Hx Alcohol Use:  No


Constitutional





Vital Sign - Last 24 Hours








 10/26/18 10/26/18





 11:17 11:56


 


Temp 98.0 


 


Pulse 56 


 


Resp 20 


 


B/P (MAP)  160/104 (122)


 


Pulse Ox 93 


 


O2 Delivery Room Air 








Physical Exam


  General Appearance: The patient is alert, has no immediate need for airway 


protection and no current signs of toxicity.


Respiratory: Chest is non tender, lungs are clear to auscultation.


Cardiac: regular rate and rhythm


Gastrointestinal: Abdomen is soft and non tender, no masses, bowel sounds 


normal.


Musculoskeletal:  Neck: Neck is supple and non tender.


   Extremities have full range of motion and are non tender.


Skin: No rashes or lesions. Patient has no lesions on erythematous base, most 


definitively seen on the chin, redness on the neck, red areas noted on the left 


side of the scalp.





DIFFERENTIAL DIAGNOSIS: After history and physical exam differential diagnosis 


was considered for contact dermatitis, shingles, folliculitis.





Medical Decision Making


ED Course/Re-evaluation


ED Course


Patient was admitted to an exam room, history and physical were obtained. 


Differential diagnoses were considered. On examination lungs are clear, heart is


regular, abdomen soft nontender. Patient does have red areas on her scalp on the


left side of the scalp, she has yellow lesions on an erythematous base on her 


chin and neck. I believe that this is more consistent with a shingles eruption. 


Patient states she has been under significant amount of stress secondary to her 


hospitalization and surgery. I believe that that is underlying cause of her 


symptoms. We will go ahead and treat her with Valtrex 1 g 3 times a day for 7 


days. We'll also refill her hydrocodone. She is to follow-up with her primary 


care provider in the next week. Patient verbalized understanding and agreement 


with plan.


Decision to Disposition Date:  Oct 26, 2018


Decision to Disposition Time:  11:41





Depart


Departure


Latest Vital Signs





Vital Signs








  Date Time  Temp Pulse Resp B/P (MAP) Pulse Ox O2 Delivery O2 Flow Rate FiO2


 


10/26/18 11:56    160/104 (122)    


 


10/26/18 11:17 98.0 56 20  93 Room Air  








Impression:  


   Primary Impression:  


   Herpes zoster


Condition:  Improved


Disposition:  HOME OR SELF-CARE


Referrals:  


HIEU FERNANDO DO (PCP)


New Scripts


Hydrocodone Bit/Acetaminophen (HYDROCODON-ACETAMINOPHEN 5-325) 1 Each Tablet


1 EACH PO Q4-6H PRN for PAIN, #12 TAB


   Prov: CHLOE BAKER         10/26/18 


Valacyclovir Hcl (VALTREX) 1,000 Mg Tablet


1000 MG PO TID, #20 TAB


   Prov: CHLOE BAKER         10/26/18


Patient Instructions:  Shingles (ED)





Additional Instructions:  


Get plenty of rest.


Limit activity by pain.


Take the medication as prescribed.


Follow up with your primary care provider next week.


Return to the ER if condition worsens.





Problem Qualifiers








   Primary Impression:  


   Herpes zoster


   Herpes zoster complications:  without complications  Qualified Codes:  B02.9 


   - Zoster without complications








CHLOE BAKER                Oct 26, 2018 11:22

## 2018-10-27 ENCOUNTER — HOSPITAL ENCOUNTER (INPATIENT)
Dept: HOSPITAL 89 - ER | Age: 73
LOS: 4 days | Discharge: HOME HEALTH | DRG: 690 | End: 2018-10-31
Attending: INTERNAL MEDICINE | Admitting: INTERNAL MEDICINE
Payer: MEDICARE

## 2018-10-27 VITALS — SYSTOLIC BLOOD PRESSURE: 165 MMHG | DIASTOLIC BLOOD PRESSURE: 87 MMHG

## 2018-10-27 VITALS
WEIGHT: 293 LBS | BODY MASS INDEX: 50.02 KG/M2 | HEIGHT: 64 IN | WEIGHT: 293 LBS | BODY MASS INDEX: 50.02 KG/M2 | HEIGHT: 64 IN

## 2018-10-27 VITALS — SYSTOLIC BLOOD PRESSURE: 147 MMHG | DIASTOLIC BLOOD PRESSURE: 92 MMHG

## 2018-10-27 DIAGNOSIS — Z99.81: ICD-10-CM

## 2018-10-27 DIAGNOSIS — Z90.49: ICD-10-CM

## 2018-10-27 DIAGNOSIS — Z95.5: ICD-10-CM

## 2018-10-27 DIAGNOSIS — Z88.2: ICD-10-CM

## 2018-10-27 DIAGNOSIS — R53.1: ICD-10-CM

## 2018-10-27 DIAGNOSIS — B37.2: ICD-10-CM

## 2018-10-27 DIAGNOSIS — Z87.442: ICD-10-CM

## 2018-10-27 DIAGNOSIS — E86.0: ICD-10-CM

## 2018-10-27 DIAGNOSIS — E66.01: ICD-10-CM

## 2018-10-27 DIAGNOSIS — N30.00: Primary | ICD-10-CM

## 2018-10-27 DIAGNOSIS — N17.9: ICD-10-CM

## 2018-10-27 DIAGNOSIS — B02.9: ICD-10-CM

## 2018-10-27 DIAGNOSIS — F41.9: ICD-10-CM

## 2018-10-27 DIAGNOSIS — N18.3: ICD-10-CM

## 2018-10-27 DIAGNOSIS — Z91.040: ICD-10-CM

## 2018-10-27 DIAGNOSIS — I12.9: ICD-10-CM

## 2018-10-27 DIAGNOSIS — Z23: ICD-10-CM

## 2018-10-27 LAB — PLATELET COUNT, AUTOMATED: 191 K/UL (ref 150–450)

## 2018-10-27 PROCEDURE — 82565 ASSAY OF CREATININE: CPT

## 2018-10-27 PROCEDURE — 82435 ASSAY OF BLOOD CHLORIDE: CPT

## 2018-10-27 PROCEDURE — 84460 ALANINE AMINO (ALT) (SGPT): CPT

## 2018-10-27 PROCEDURE — 82374 ASSAY BLOOD CARBON DIOXIDE: CPT

## 2018-10-27 PROCEDURE — 82247 BILIRUBIN TOTAL: CPT

## 2018-10-27 PROCEDURE — 85025 COMPLETE CBC W/AUTO DIFF WBC: CPT

## 2018-10-27 PROCEDURE — 84295 ASSAY OF SERUM SODIUM: CPT

## 2018-10-27 PROCEDURE — 71046 X-RAY EXAM CHEST 2 VIEWS: CPT

## 2018-10-27 PROCEDURE — 97162 PT EVAL MOD COMPLEX 30 MIN: CPT

## 2018-10-27 PROCEDURE — 99283 EMERGENCY DEPT VISIT LOW MDM: CPT

## 2018-10-27 PROCEDURE — 96365 THER/PROPH/DIAG IV INF INIT: CPT

## 2018-10-27 PROCEDURE — 84520 ASSAY OF UREA NITROGEN: CPT

## 2018-10-27 PROCEDURE — 82310 ASSAY OF CALCIUM: CPT

## 2018-10-27 PROCEDURE — 99284 EMERGENCY DEPT VISIT MOD MDM: CPT

## 2018-10-27 PROCEDURE — 82040 ASSAY OF SERUM ALBUMIN: CPT

## 2018-10-27 PROCEDURE — 36415 COLL VENOUS BLD VENIPUNCTURE: CPT

## 2018-10-27 PROCEDURE — 90674 CCIIV4 VAC NO PRSV 0.5 ML IM: CPT

## 2018-10-27 PROCEDURE — 84155 ASSAY OF PROTEIN SERUM: CPT

## 2018-10-27 PROCEDURE — 82947 ASSAY GLUCOSE BLOOD QUANT: CPT

## 2018-10-27 PROCEDURE — 84075 ASSAY ALKALINE PHOSPHATASE: CPT

## 2018-10-27 PROCEDURE — 70450 CT HEAD/BRAIN W/O DYE: CPT

## 2018-10-27 PROCEDURE — 81001 URINALYSIS AUTO W/SCOPE: CPT

## 2018-10-27 PROCEDURE — 84132 ASSAY OF SERUM POTASSIUM: CPT

## 2018-10-27 PROCEDURE — 97165 OT EVAL LOW COMPLEX 30 MIN: CPT

## 2018-10-27 PROCEDURE — 87088 URINE BACTERIA CULTURE: CPT

## 2018-10-27 PROCEDURE — 84450 TRANSFERASE (AST) (SGOT): CPT

## 2018-10-27 RX ADMIN — NYSTATIN SCH GM: 100000 POWDER TOPICAL at 22:10

## 2018-10-27 RX ADMIN — DOCUSATE SODIUM SCH MG: 100 CAPSULE, LIQUID FILLED ORAL at 21:41

## 2018-10-27 RX ADMIN — METOPROLOL TARTRATE SCH MG: 50 TABLET, FILM COATED ORAL at 21:40

## 2018-10-27 RX ADMIN — HYDROCODONE BITARTRATE AND ACETAMINOPHEN PRN EACH: 7.5; 325 TABLET ORAL at 21:40

## 2018-10-27 RX ADMIN — FAMOTIDINE SCH MG: 20 TABLET, FILM COATED ORAL at 21:40

## 2018-10-27 NOTE — RADIOLOGY IMAGING REPORT
FACILITY: [facility] 

 

PATIENT NAME: Mila Maria

: 1945

MR: [id number]

V: [id number]

EXAM DATE: 

ORDERING PHYSICIAN: CHLOE BAKER

TECHNOLOGIST: 

 

[place] [facility] [place]: Mila Maria

: 1945

MRN: [id number]

Visit/Account:[id number]

Date of Sevice: 10/27/2018

 

ACCESSION #: [id number]

 

2 VIEWS CHEST

 

INDICATION: Confusion

 

COMPARISON: 2018.

 

FINDINGS:

Cardiomediastinal silhouette and pulmonary vessels within normal limits.

There is no focal infiltrate or lobar consolidation.

There is no pneumothorax or pleural effusion.

No nodule. Scarring seen in both lower lobes. Upper abdomen is unremarkable. No acute bony abnormalit
y.

 

 

IMPRESSION:

1. No acute cardiopulmonary process.

 

Report Dictated By: Chi Ledesma at 10/27/2018 6:20 PM

 

Report E-Signed By: Chi Ledesma  at 10/27/2018 6:22 PM

 

WSN:[id number]

## 2018-10-27 NOTE — RADIOLOGY IMAGING REPORT
FACILITY: [facility] 

 

PATIENT NAME: Mila Maria

: 1945

MR: [id number]

V: [id number]

EXAM DATE: 

ORDERING PHYSICIAN: CHLOE BAKER

TECHNOLOGIST: 

 

Location: [facility]

Patient: Mila Maria

: 1945

MRN: [id number]

Visit/Account:[id number]

Date of Sevice: 10/27/2018

 

ACCESSION #: [id number]

 

EXAMINATION:   Head CT without intravenous contrast

 

HISTORY:  Confusion.

 

COMPARISON:  None.

 

TECHNIQUE:   Contiguous axial images were obtained from the skull base to the vertex without intraven
ous contrast.  Sagittal and coronal reformatted images are also submitted.

 

One of the following dose optimization techniques was utilized in the performance of this exam: Autom
ated exposure control; adjustment of the mA and/or kV according to the patient's size; or use of an i
terative  reconstruction technique.  Specific details can be referenced in the facility's radiology C
T exam operational policy.

 

FINDINGS:

 

Brain and intracranial structures:  Ventricles, sulci, and cisterns are normal in size. Gray-white ma
tter differentiation is maintained. Patchy hypoattenuating regions in the peripheral and deep cerebra
l white matter.

 

No midline shift, acute hemorrhage, mass, or evidence of acute infarct.

 

Vessels: Mild calcification of the carotid siphons.

 

Calvarium / scalp:  Negative. No acute fracture.

 

Skull base / visualized face:  Mild rightward bowing of the nasal septum.

 

Visualized sinuses / orbits:  Trace mucosal thickening in the paranasal sinuses.

 

IMPRESSION:

No CT evidence of acute intracranial pathology.

 

Moderate white matter changes, likely chronic small vessel ischemic changes.

 

Report Dictated By: Akhil Alvarado MD at 10/27/2018 6:14 PM

 

Report E-Signed By: Akhil Alvarado MD  at 10/27/2018 6:20 PM

 

WSN:[id number]

## 2018-10-27 NOTE — ER REPORT
History and Physical


Time Seen By MD:  16:45


Hx. of Stated Complaint:  


REPORTS SHE IS UNABLE TO CARE FOR SELF AT HOME.


HPI/ROS


CHIEF COMPLAINT: Unable to care for self at home





HISTORY OF PRESENT ILLNESS: 72-year-old female patient presents to emergency 


room with complaint of a pineal care for herself at home. Patient states that 


she lives at home and is unable to get up off the couch, she also states that 


she is not able to get up and walk at home. Patient states that she's not had 


any nausea, vomiting. She states that she has not taken any medication for this.


She states that she needs to be taken care of. Her granddaughter states that 


patient has been mildly confused today. She states that she is not been able to 


take care of her general duties. She states she feels better that she is able to


be more stable, but she is concerned that she is more ill.





REVIEW OF SYSTEMS:


Respiratory: No cough, no dyspnea.


Cardiovascular: No chest pain, no palpitations.


Gastrointestinal: No vomiting, no abdominal pain.


Musculoskeletal: No back pain.


Allergies:  


Coded Allergies:  


     latex (Verified  Allergy, Severe, RASH, 10/15/18)


   


   PEELS SKIN OFF


     Sulfa (Sulfonamide Antibiotics) (Verified  Allergy, Unknown, HIVES, 


9/11/18)


Home Meds


Active Scripts


Valacyclovir Hcl (VALTREX) 1,000 Mg Tablet, 1000 MG PO TID, #20 TAB


   Prov:CHLOE BAKER         10/26/18


Promethazine Hcl (PROMETHAZINE HCL) 25 Mg Tablet, 25 MG PO Q8H PRN for 


NAUSEA/VOMITING, #20 TAB 0 Refills


   Prov:JAVY MARY MD         8/3/18


Nifedipine (PROCARDIA XL) 30 Mg Tab.er.24, 30 MG PO QDAY, #30 TAB


   Prov:RIP GARCIA DO         6/17/18


Reported Medications


Hydrocodone Bit/Acetaminophen (NORCO 7.5-325 TABLET) 1 Each Tablet, 1 EACH PO 


Q4-6H PRN for PAIN, #30


   10/18/18


Ibuprofen (IBUPROFEN) 800 Mg Tablet, 1 TAB PO TID PRN for PAIN, #50 TAB


   10/18/18


Famotidine (FAMOTIDINE) 20 Mg Tablet, 20 MG PO BID, #20 TAB


   10/18/18


Ciprofloxacin Hcl (CIPROFLOXACIN HCL) 500 Mg Tablet, 500 MG PO Q12H for 10 Days,


#20 TAB


   10/18/18


Hydroxyzine Hcl (HYDROXYZINE HCL) 50 Mg Tablet, 50 MG PO TID PRN for ANXIETY


   10/18/18


Solifenacin Succinate (VESICARE) 10 Mg Tablet, 10 MG PO QDAY


   10/15/18


Phenazopyridine Hcl (PHENAZOPYRIDINE HCL) 200 Mg Tablet, 200 MG PO TID PRN for 


BURNING WITH URINATION, TAB


   8/27/18


Chlorpheniramine/Dextromethorp (Eql Cough-Cold Relief Hbp Tab) 4 Mg-30 Mg 


Tablet, 1 TAB PO PRN PRN for CONGESTION


   8/21/18


Docusate Sodium (COLACE) 100 Mg Capsule, 100 MG PO PRN, #30 CAPSULE


   7/19/18


Metoprolol Tartrate (Metoprolol Tartrate) 100 Mg Tablet, 100 MG PO BID, #60 0 


Refills


   5/18/11


Discontinued Scripts


Hydrocodone Bit/Acetaminophen (HYDROCODON-ACETAMINOPHEN 5-325) 1 Each Tablet, 1 


EACH PO Q4-6H PRN for PAIN, #12 TAB


   Prov:CHLOE BAKER FNP         10/26/18


Past Medical/Surgical History


Patient has a past medical history of hypertension, oxygen at night, 


cholecystitis, urinary leakage, arthritis, degenerative disc disease, then skin,


anxiety.


Patient has surgical history of cholecystectomy, as well, cystoscopy, lithotr


ipsy, right stent placement.


Reviewed Nurses Notes:  Yes


Hx Smoking:  No


Smoking Status:  Never Smoker


Hx Substance Use Disorder:  No


Hx Alcohol Use:  No


Constitutional





Vital Sign - Last 24 Hours








 10/27/18 10/27/18 10/27/18 10/27/18





 16:41 16:48 17:00 17:03


 


Temp 97.7   


 


Pulse 94 88  79


 


Resp 24   


 


B/P (MAP) 153/90  173/96 (121) 


 


Pulse Ox 94 94  93


 


O2 Delivery Room Air   


 


    





 10/27/18 10/27/18 10/27/18 10/27/18





 17:18 17:30 17:33 17:48


 


Pulse 75  74 76


 


B/P (MAP)  171/94 (119)  


 


Pulse Ox 92  93 94





 10/27/18 10/27/18 10/27/18 10/27/18





 18:18 18:23 18:30 18:38


 


Pulse 74 60  62


 


B/P (MAP)   ???/??? (2657) 


 


Pulse Ox 93 93  93





 10/27/18   





 18:53   


 


Pulse 77   


 


Pulse Ox 94   








Physical Exam


  General Appearance: The patient is alert, has no immediate need for airway 


protection and no current signs of toxicity. 


ENT: Tympanic membranes are pearly-gray, auditory canals are patent, mucous m


embranes are moist.


Respiratory: Chest is non tender, lungs are clear to auscultation.


Cardiac: regular rate and rhythm


Gastrointestinal: Abdomen is soft and non tender, no masses, bowel sounds normal


.


Musculoskeletal:  Neck: Neck is supple and non tender.


   Extremities have full range of motion and are non tender.


Skin: No rashes or lesions. Patient does have some erythema in the perineal area





DIFFERENTIAL DIAGNOSIS: After history and physical exam differential diagnosis 


was considered for UTI, pneumonia, weakness.





Medical Decision Making


Data Points


Result Diagram:  


10/27/18 1734                                                                   


            10/27/18 1734





Laboratory





Hematology








Test


 10/27/18


17:34 10/27/18


17:48


 


Red Blood Count


 4.94 M/uL


(4.17-5.56) 





 


Mean Corpuscular Volume


 89.3 fL


(80.0-96.0) 





 


Mean Corpuscular Hemoglobin


 30.0 pg


(26.0-33.0) 





 


Mean Corpuscular Hemoglobin


Concent 33.6 g/dL


(32.0-36.0) 





 


Red Cell Distribution Width


 15.0 %


(11.5-14.5) 





 


Mean Platelet Volume


 8.7 fL


(7.2-11.1) 





 


Neutrophils (%) (Auto)


 52.0 %


(39.4-72.5) 





 


Lymphocytes (%) (Auto)


 30.4 %


(17.6-49.6) 





 


Monocytes (%) (Auto)


 13.4 %


(4.1-12.4) 





 


Eosinophils (%) (Auto)


 2.8 %


(0.4-6.7) 





 


Basophils (%) (Auto)


 1.4 %


(0.3-1.4) 





 


Nucleated RBC Relative Count


(auto) 0.1 /100WBC 


 





 


Neutrophils # (Auto)


 2.2 K/uL


(2.0-7.4) 





 


Lymphocytes # (Auto)


 1.3 K/uL


(1.3-3.6) 





 


Monocytes # (Auto)


 0.6 K/uL


(0.3-1.0) 





 


Eosinophils # (Auto)


 0.1 K/uL


(0.0-0.5) 





 


Basophils # (Auto)


 0.1 K/uL


(0.0-0.1) 





 


Nucleated RBC Absolute Count


(auto) 0.00 K/uL 


 





 


Sodium Level


 138 mmol/L


(137-145) 





 


Potassium Level


 3.7 mmol/L


(3.5-5.0) 





 


Chloride Level


 105 mmol/L


() 





 


Carbon Dioxide Level


 20 mmol/L


(22-31) 





 


Blood Urea Nitrogen


 24 mg/dl


(7-18) 





 


Creatinine


 2.00 mg/dl


(0.52-1.04) 





 


Glomerular Filtration Rate


Calc 24.6 


 





 


Random Glucose


 110 mg/dl


() 





 


Calcium Level


 9.8 mg/dl


(8.4-10.2) 





 


Total Bilirubin


 0.5 mg/dl


(0.2-1.3) 





 


Aspartate Amino Transf


(AST/SGOT) 32 U/L (0-35) 


 





 


Alanine Aminotransferase


(ALT/SGPT) 39 U/L (0-56) 


 





 


Alkaline Phosphatase


 101 U/L


(0-126) 





 


Total Protein


 7.8 g/dl


(6.3-8.2) 





 


Albumin


 3.9 g/dl


(3.5-5.0) 





 


Urine Color  Yellow 


 


Urine Clarity


 


 Slightly-cloudy





 


Urine pH


 


 5.0 pH


(4.8-9.5)


 


Urine Specific Gravity  1.017 


 


Urine Protein


 


 Negative mg/dL


(NEGATIVE)


 


Urine Glucose (UA)


 


 Negative mg/dL


(NEGATIVE)


 


Urine Ketones


 


 Negative mg/dL


(NEGATIVE)


 


Urine Blood


 


 Negative


(NEGATIVE)


 


Urine Nitrite


 


 Negative


(NEGATIVE)


 


Urine Bilirubin


 


 Negative


(NEGATIVE)


 


Urine Urobilinogen


 


 Negative mg/dL


(0.2-1.9)


 


Urine Leukocyte Esterase


 


 Moderate


(NEGATIVE)


 


Urine RBC


 


 7 /HPF


(0-2/HPF)


 


Urine WBC


 


 58 /HPF


(0-5/HPF)


 


Urine WBC Clumps  Few /HPF 


 


Urine Squamous Epithelial


Cells 


 Few /LPF


(NONE-FEW)


 


Urine Bacteria


 


 Negative /HPF


(NONE-FEW)


 


Urine Hyaline Casts


 


 Few /LPF


(NONE-FEW)


 


Urine Mucus


 


 Few /HPF


(NONE-FEW)








Chemistry








Test


 10/27/18


17:34 10/27/18


17:48


 


White Blood Count


 4.3 k/uL


(4.5-11.0) 





 


Red Blood Count


 4.94 M/uL


(4.17-5.56) 





 


Hemoglobin


 14.8 g/dL


(12.0-16.0) 





 


Hematocrit


 44.1 %


(34.0-47.0) 





 


Mean Corpuscular Volume


 89.3 fL


(80.0-96.0) 





 


Mean Corpuscular Hemoglobin


 30.0 pg


(26.0-33.0) 





 


Mean Corpuscular Hemoglobin


Concent 33.6 g/dL


(32.0-36.0) 





 


Red Cell Distribution Width


 15.0 %


(11.5-14.5) 





 


Platelet Count


 191 K/uL


(150-450) 





 


Mean Platelet Volume


 8.7 fL


(7.2-11.1) 





 


Neutrophils (%) (Auto)


 52.0 %


(39.4-72.5) 





 


Lymphocytes (%) (Auto)


 30.4 %


(17.6-49.6) 





 


Monocytes (%) (Auto)


 13.4 %


(4.1-12.4) 





 


Eosinophils (%) (Auto)


 2.8 %


(0.4-6.7) 





 


Basophils (%) (Auto)


 1.4 %


(0.3-1.4) 





 


Nucleated RBC Relative Count


(auto) 0.1 /100WBC 


 





 


Neutrophils # (Auto)


 2.2 K/uL


(2.0-7.4) 





 


Lymphocytes # (Auto)


 1.3 K/uL


(1.3-3.6) 





 


Monocytes # (Auto)


 0.6 K/uL


(0.3-1.0) 





 


Eosinophils # (Auto)


 0.1 K/uL


(0.0-0.5) 





 


Basophils # (Auto)


 0.1 K/uL


(0.0-0.1) 





 


Nucleated RBC Absolute Count


(auto) 0.00 K/uL 


 





 


Glomerular Filtration Rate


Calc 24.6 


 





 


Calcium Level


 9.8 mg/dl


(8.4-10.2) 





 


Total Bilirubin


 0.5 mg/dl


(0.2-1.3) 





 


Aspartate Amino Transf


(AST/SGOT) 32 U/L (0-35) 


 





 


Alanine Aminotransferase


(ALT/SGPT) 39 U/L (0-56) 


 





 


Alkaline Phosphatase


 101 U/L


(0-126) 





 


Total Protein


 7.8 g/dl


(6.3-8.2) 





 


Albumin


 3.9 g/dl


(3.5-5.0) 





 


Urine Color  Yellow 


 


Urine Clarity


 


 Slightly-cloudy





 


Urine pH


 


 5.0 pH


(4.8-9.5)


 


Urine Specific Gravity  1.017 


 


Urine Protein


 


 Negative mg/dL


(NEGATIVE)


 


Urine Glucose (UA)


 


 Negative mg/dL


(NEGATIVE)


 


Urine Ketones


 


 Negative mg/dL


(NEGATIVE)


 


Urine Blood


 


 Negative


(NEGATIVE)


 


Urine Nitrite


 


 Negative


(NEGATIVE)


 


Urine Bilirubin


 


 Negative


(NEGATIVE)


 


Urine Urobilinogen


 


 Negative mg/dL


(0.2-1.9)


 


Urine Leukocyte Esterase


 


 Moderate


(NEGATIVE)


 


Urine RBC


 


 7 /HPF


(0-2/HPF)


 


Urine WBC


 


 58 /HPF


(0-5/HPF)


 


Urine WBC Clumps  Few /HPF 


 


Urine Squamous Epithelial


Cells 


 Few /LPF


(NONE-FEW)


 


Urine Bacteria


 


 Negative /HPF


(NONE-FEW)


 


Urine Hyaline Casts


 


 Few /LPF


(NONE-FEW)


 


Urine Mucus


 


 Few /HPF


(NONE-FEW)








Urinalysis








Test


 10/27/18


17:48


 


Urine Color Yellow 


 


Urine Clarity


 Slightly-cloudy





 


Urine pH


 5.0 pH


(4.8-9.5)


 


Urine Specific Gravity 1.017 


 


Urine Protein


 Negative mg/dL


(NEGATIVE)


 


Urine Glucose (UA)


 Negative mg/dL


(NEGATIVE)


 


Urine Ketones


 Negative mg/dL


(NEGATIVE)


 


Urine Blood


 Negative


(NEGATIVE)


 


Urine Nitrite


 Negative


(NEGATIVE)


 


Urine Bilirubin


 Negative


(NEGATIVE)


 


Urine Urobilinogen


 Negative mg/dL


(0.2-1.9)


 


Urine Leukocyte Esterase


 Moderate


(NEGATIVE)


 


Urine RBC


 7 /HPF


(0-2/HPF)


 


Urine WBC


 58 /HPF


(0-5/HPF)


 


Urine WBC Clumps Few /HPF 


 


Urine Squamous Epithelial


Cells Few /LPF


(NONE-FEW)


 


Urine Bacteria


 Negative /HPF


(NONE-FEW)


 


Urine Hyaline Casts


 Few /LPF


(NONE-FEW)


 


Urine Mucus


 Few /HPF


(NONE-FEW)











EKG/Imaging


Imaging


2 VIEWS CHEST


 


INDICATION: Confusion


 


COMPARISON: 6/14/2018.


 


FINDINGS:


Cardiomediastinal silhouette and pulmonary vessels within normal limits.


There is no focal infiltrate or lobar consolidation.


There is no pneumothorax or pleural effusion.


No nodule. Scarring seen in both lower lobes. Upper abdomen is unremarkable. No 


acute bony abnormality.


 


 


IMPRESSION:


1. No acute cardiopulmonary process.


 


Report Dictated By: Chi Ledesma at 10/27/2018 6:20 PM


 


Report E-Signed By: Chi Ledesma  at 10/27/2018 6:22 PM





EXAMINATION:   Head CT without intravenous contrast


 


HISTORY:  Confusion.


 


COMPARISON:  None.


 


TECHNIQUE:   Contiguous axial images were obtained from the skull base to the 


vertex without intravenous contrast.  Sagittal and coronal reformatted images 


are also submitted.


 


One of the following dose optimization techniques was utilized in the 


performance of this exam: Automated exposure control; adjustment of the mA 


and/or kV according to the patient's size; or use of an iterative  


reconstruction technique.  Specific details can be referenced in the facility's 


radiology CT exam operational policy.


 


FINDINGS:


 


Brain and intracranial structures:  Ventricles, sulci, and cisterns are normal 


in size. Gray-white matter differentiation is maintained. Patchy hypoattenuating


regions in the peripheral and deep cerebral white matter.


 


No midline shift, acute hemorrhage, mass, or evidence of acute infarct.


 


Vessels: Mild calcification of the carotid siphons.


 


Calvarium / scalp:  Negative. No acute fracture.


 


Skull base / visualized face:  Mild rightward bowing of the nasal septum.


 


Visualized sinuses / orbits:  Trace mucosal thickening in the paranasal sinuses.


 


IMPRESSION:


No CT evidence of acute intracranial pathology.


 


Moderate white matter changes, likely chronic small vessel ischemic changes.


 


Report Dictated By: Akhil Alvarado MD at 10/27/2018 6:14 PM


 


Report E-Signed By: Akhil Alvarado MD  at 10/27/2018 6:20 PM





ED Course/Re-evaluation


ED Course


Patient was admitted exam room, history of physical were obtained. Differential 


diagnoses were considered. He CBC, CMP, urinalysis, chest x-ray, CT scan of the 


head were done. Lab results were unremarkable, except she did have worsening of 


her creatinine up to 2.0 from 1.6. Also had a leukocyte esterase with 58 white 


blood cells per high-power field her urine. I discussed the case with Dr. Greenwood, hospitals, he felt that the patient could very easily go home with oral


medication for pushing fluids. However case could be made that the patient could


be admitted and treated with IV antibiotics and IV fluids. I discussed the 


options with the patient and her granddaughter. Patient states she does want to 


be admitted. I discussed the case with Dr. Greenwood who agreed to accept the 


patient for admission.


Decision to Disposition Date:  Oct 27, 2018


Decision to Disposition Time:  19:04





Depart


Departure


Latest Vital Signs





Vital Signs








  Date Time  Temp Pulse Resp B/P (MAP) Pulse Ox O2 Delivery O2 Flow Rate FiO2


 


10/27/18 18:53  77   94   


 


10/27/18 18:30    ???/??? ([id number])    


 


10/27/18 16:41 97.7  24   Room Air  








Impression:  


   Primary Impression:  


   UTI (urinary tract infection)


   Additional Impression:  


   Dehydration


Condition:  Condition Unchanged


Disposition:  Admitted from ER


Referrals:  


HIEU FERNANOD DO (PCP)





Problem Qualifiers








   Primary Impression:  


   UTI (urinary tract infection)


   Urinary tract infection type:  acute cystitis  Hematuria presence:  without 


   hematuria  Qualified Codes:  N30.00 - Acute cystitis without hematuria








CHLOE BAKER                Oct 27, 2018 16:45

## 2018-10-27 NOTE — HISTORY & PHYSICAL
History of Present Illness


Chief Complaint


Weak


History of Present Illness


71yo female with PMHx significant for recurrent nephrolithiasis, recurrent UTIs,


HTN, recent herpes zoster. She reports increasing weakness and inability to care


for herself. She has had slowly progressive back/hip pain limiting her mobility.


She has had increasing problems with urinary incontinence over past several 


years. All of this seemed to worsen significantly over the past few weeks-


months. She had recent lithotripsy for left nephrolithiasis with stent removal. 


Following this she developed rash over her left parietal and left submandibular 


areas. She was seen in the ER and diagnosed with herpes zoster. She was started 


on oral Valtrex 1gm TID (x 7days). She has also been incontinent of urine and 


rarely makes it to the toilet to void. She wears adult diapers. She has 


developed significant rash/discomfort in her perineal/inguinal areas. She is 


unsure if she has had any fevers or chills. No diarrhea. Her appetite has been 


poor. She has tried to drink 2 quarts of fluids daily, "but it's hard". She was 


evaluated in the ER and found to have evidence of UTI, dehydration, candidiasis,


C2-C3 herpes zoster. She was recommended for admission.





History


Problems:  


(1) History of lithotripsy


Status:  Resolved


(2) History of kidney stones


Status:  Chronic


(3) HTN (hypertension)


Status:  Chronic


(4) CKD (chronic kidney disease) stage 3, GFR 30-59 ml/min


Status:  Chronic


(5) Sepsis


Status:  Resolved


(6) History of cholecystectomy


Status:  Resolved


(7) S/P ureteral stent placement


Status:  Resolved


(8) ANXIETY DISORDER, UNSPECIFIED


Status:  Chronic


Home Meds


Active Scripts


Valacyclovir Hcl (VALTREX) 1,000 Mg Tablet, 1000 MG PO TID, #20 TAB


   Prov:CHLOE BAKER         10/26/18


Promethazine Hcl (PROMETHAZINE HCL) 25 Mg Tablet, 25 MG PO Q8H PRN for 


NAUSEA/VOMITING, #20 TAB 0 Refills


   Prov:JAVY MARY MD         8/3/18


Nifedipine (PROCARDIA XL) 30 Mg Tab.er.24, 30 MG PO QDAY, #30 TAB


   Prov:RIP GARCIA DO         18


Reported Medications


Hydrocodone Bit/Acetaminophen (NORCO 7.5-325 TABLET) 1 Each Tablet, 1 EACH PO 


Q4-6H PRN for PAIN, #30


   10/18/18


Ibuprofen (IBUPROFEN) 800 Mg Tablet, 1 TAB PO TID PRN for PAIN, #50 TAB


   10/18/18


Famotidine (FAMOTIDINE) 20 Mg Tablet, 20 MG PO BID, #20 TAB


   10/18/18


Ciprofloxacin Hcl (CIPROFLOXACIN HCL) 500 Mg Tablet, 500 MG PO Q12H for 10 Days,


#20 TAB


   10/18/18


Hydroxyzine Hcl (HYDROXYZINE HCL) 50 Mg Tablet, 50 MG PO TID PRN for ANXIETY


   10/18/18


Solifenacin Succinate (VESICARE) 10 Mg Tablet, 10 MG PO QDAY


   10/15/18


Phenazopyridine Hcl (PHENAZOPYRIDINE HCL) 200 Mg Tablet, 200 MG PO TID PRN for 


BURNING WITH URINATION, TAB


   18


Chlorpheniramine/Dextromethorp (Eql Cough-Cold Relief Hbp Tab) 4 Mg-30 Mg Tab


let, 1 TAB PO PRN PRN for CONGESTION


   18


Docusate Sodium (COLACE) 100 Mg Capsule, 100 MG PO PRN, #30 CAPSULE


   18


Metoprolol Tartrate (Metoprolol Tartrate) 100 Mg Tablet, 100 MG PO BID, #60 0 


Refills


   11


Discontinued Scripts


Hydrocodone Bit/Acetaminophen (HYDROCODON-ACETAMINOPHEN 5-325) 1 Each Tablet, 1 


EACH PO Q4-6H PRN for PAIN, #12 TAB


   Prov:CHLOE BAKER FNP         10/26/18


Allergies:  


Coded Allergies:  


     latex (Verified  Allergy, Severe, RASH, 10/15/18)


   


   PEELS SKIN OFF


     Sulfa (Sulfonamide Antibiotics) (Verified  Allergy, Unknown, HIVES, 


18)


Other Social/Family Hx


She currently lives alone in her own mobile home.


Hx Smoking:  No


Smoking Status:  Never Smoker


Caffeine Intake:  Coffee


Caffeine/Cups Per Day:  2 CUPS A DAY


Hx Alcohol Use:  No


Hx Substance Use Disorder:  No


Social Drug Use:  Never





Review of Systems


Constitutional:  No Fever, No Chills, No Night Sweats


Neurological:  Weakness; No Syncope


Eyes:  No Vision Change, No Loss of Vision


ENT:  No Hearing Loss


Cardiovascular:  No Chest Pain, No Palpitations


Respiratory:  No Shortness of Breath, No Cough


Gastrointestinal:  No Nausea, No Vomiting, No Diarrhea, No Hematemesis, No 


Hematochezia, No Melena, No Abdominal Pain


Genitourinary:  Dysuria, Urinary Incontinence; No Hematuria


Musculoskeletal:  Pain, Impaired Mobility


Psychiatric:  Anxiety





Exam


Vital Signs





Vital Signs








  Date Time  Temp Pulse Resp B/P (MAP) Pulse Ox O2 Delivery O2 Flow Rate FiO2


 


10/27/18 20:04 98.0 93 24 165/87 (113) 92 Room Air  








General Appearance:  Alert, Awake


Neuro:  Other (generalized weakness virtually all muscle groups)


Eyes:  PERRLA


ENT:  Oropharynx Clear, Other (coalescent lesions over left parietal 


area/multiple lesions over left submandibular area and anterior neck - they do 


not cross midline)


Neck:  Other (several enlarged LN left preauricular/anterior cervical areas)


Cardiovascular:  Regular Rate and Rhythm (with systolic murmur best at apex)


Respiratory:  Other (Fairly clear)


Chest:  No Tenderness


GI:  Other (Obese/soft/NT/BS present)


:  No CVA Tenderness


Musculoskeletal:  Other (generalized weakness)


Extremities:  Warm, Perfused


Integumentary:  Generalized Fragile Skin, Other (candidal changes bilateral 


inguinal areas and throughout perineum)


Psych:  Alert & Oriented X3





Medical Decision Making


Data Points


Result Diagram:  


10/27/18 1734                                                                   


            10/27/18 1734














Item Value  Date Time


 


Urine Mucus Few /HPF 10/27/18 1748


 


Urine Hyaline Casts Few /LPF 10/27/18 1748


 


Urine Bacteria Negative /HPF 10/27/18 1748


 


Urine Squamous Epithelial Cells Few /LPF 10/27/18 1748


 


Urine WBC Clumps Few /HPF 10/27/18 1748


 


Urine WBC 58 /HPF 10/27/18 1748


 


Urine RBC 7 /HPF 10/27/18 1748


 


Urine Leukocyte Esterase Moderate H 10/27/18 1748


 


Urine Urobilinogen Negative mg/dL 10/27/18 1748


 


Urine Bilirubin Negative 10/27/18 1748


 


Urine Nitrite Negative 10/27/18 1748


 


Urine Blood Negative 10/27/18 1748


 


Urine Ketones Negative mg/dL 10/27/18 1748


 


Urine Glucose (UA) Negative mg/dL 10/27/18 1748


 


Urine Protein Negative mg/dL 10/27/18 1748


 


Urine Specific Gravity 1.017 10/27/18 1748


 


Urine pH 5.0 pH 10/27/18 1748


 


Urine Clarity Slightly-cloudy 10/27/18 1748


 


Urine Color Yellow 10/27/18 1748


 


Albumin 3.9 g/dl 10/27/18 1734


 


Total Protein 7.8 g/dl 10/27/18 1734


 


Alkaline Phosphatase 101 U/L 10/27/18 1734


 


Alanine Aminotransferase (ALT/SGPT) 39 U/L 10/27/18 1734


 


Aspartate Amino Transf (AST/SGOT) 32 U/L 10/27/18 1734


 


Total Bilirubin 0.5 mg/dl 10/27/18 1734


 


Calcium Level 9.8 mg/dl 10/27/18 1734











EKG / Imaging


Imaging


PATIENT NAME: Mila Maria


: 1945


MR: [id number]


V: [id number]


EXAM DATE: [id number]


ORDERING PHYSICIAN: CHLOE BAEKR


TECHNOLOGIST: 


 


[place] [facility] [place]


Patient: Mila Maria


: 1945


MRN: [id number]


Visit/Account:[id number]


Date of Sevice: 10/27/2018


 


ACCESSION #: [id number]


 


2 VIEWS CHEST


 


INDICATION: Confusion


 


COMPARISON: 2018.


 


FINDINGS:


Cardiomediastinal silhouette and pulmonary vessels within normal limits.


There is no focal infiltrate or lobar consolidation.


There is no pneumothorax or pleural effusion.


No nodule. Scarring seen in both lower lobes. Upper abdomen is unremarkable. No 


acute bony abnormality.


 


 


IMPRESSION:


1. No acute cardiopulmonary process.


 


Report Dictated By: Chi Ledesma at 10/27/2018 6:20 PM


 


Report E-Signed By: Chi Ledesma  at 10/27/2018 6:22 PM


 


WSN:[id number]


PATIENT NAME: Mila Maria


: 1945


MR: [id number]


V: [id number]


EXAM DATE: [id number]


ORDERING PHYSICIAN: CHLOE BAKER


TECHNOLOGIST: 


 


[place] [facility] [place]


Patient: Mila Maria


: 1945


MRN: [id number]


Visit/Account:[id number]


Date of Sevice: 10/27/2018


 


ACCESSION #: [id number]


 


EXAMINATION:   Head CT without intravenous contrast


 


HISTORY:  Confusion.


 


COMPARISON:  None.


 


TECHNIQUE:   Contiguous axial images were obtained from the skull base to the 


vertex without intravenous contrast.  Sagittal and coronal reformatted images 


are also submitted.


 


One of the following dose optimization techniques was utilized in the 


performance of this exam: Automated exposure control; adjustment of the mA 


and/or kV according to the patient's size; or use of an iterative  


reconstruction technique.  Specific details can be referenced in the facility's 


radiology CT exam operational policy.


 


FINDINGS:


 


Brain and intracranial structures:  Ventricles, sulci, and cisterns are normal 


in size. Gray-white matter differentiation is maintained. Patchy hypoattenuating


regions in the peripheral and deep cerebral white matter.


 


No midline shift, acute hemorrhage, mass, or evidence of acute infarct.


 


Vessels: Mild calcification of the carotid siphons.


 


Calvarium / scalp:  Negative. No acute fracture.


 


Skull base / visualized face:  Mild rightward bowing of the nasal septum.


 


Visualized sinuses / orbits:  Trace mucosal thickening in the paranasal sinuses.


 


IMPRESSION:


No CT evidence of acute intracranial pathology.


 


Moderate white matter changes, likely chronic small vessel ischemic changes.


 


Report Dictated By: Akhil Alvarado MD at 10/27/2018 6:14 PM


 


Report E-Signed By: Akhil Alvarado MD  at 10/27/2018 6:20 PM


 


WSN:[id number]





Assessment and Plan


Problems:  


(1) UTI (urinary tract infection)


Status:  Acute


Assessment & Plan:  Recurrent. She has had several infections over the past 


year. Based on sensitivities of past organisms, will start on IV Rocephin. 


Culture has been obtained in the ER. This may be complicated by her history of 


renal stones with probable residual stones on left side. May need to have ur


ology see her again.





(2) Dehydration


Status:  Acute


Assessment & Plan:  Due to decreased intake. Will give IV fluids. Watch 


labs/urine output.





(3) Herpes zoster


Status:  Acute


Assessment & Plan:  Left C2-C3 dermatomes. Will continue the Valtrex 1gm PO TID 


for 6 more days. 





(4) HTN (hypertension)


Status:  Chronic


Assessment & Plan:  Will monitor BPs and resume her metoprolol and nifedipine 


with parameters.





(5) Candidiasis of perineum


Status:  Acute


Assessment & Plan:  Will place Le cath due to incontinence, use nystatin 


topical, try to keep clean/dry.





(6) Weakness generalized


Status:  Acute


Assessment & Plan:  Most likely due to the multiple medical problems. Will treat


the UTI, dehydration, zoster. Will have PT/OT see. She may need more help at 


home if she hopes to stay at least semi-independent. Will have Social Work see.





Copies to:   HIUE FERNANDO DO ;





Venous Thromboembolism


Antithrombotics


Is Pt On Any Antithrombotics?:  Yes





Exam


Sepsis Risk:  No Definite Risk





Problem Qualifiers





(1) UTI (urinary tract infection):  


Urinary tract infection type:  acute cystitis  Hematuria presence:  without 


hematuria  Qualified Codes:  N30.00 - Acute cystitis without hematuria








KEYLA VAIL MD            Oct 27, 2018 21:38

## 2018-10-28 VITALS — DIASTOLIC BLOOD PRESSURE: 86 MMHG | SYSTOLIC BLOOD PRESSURE: 148 MMHG

## 2018-10-28 VITALS — SYSTOLIC BLOOD PRESSURE: 153 MMHG | DIASTOLIC BLOOD PRESSURE: 79 MMHG

## 2018-10-28 VITALS — DIASTOLIC BLOOD PRESSURE: 94 MMHG | SYSTOLIC BLOOD PRESSURE: 152 MMHG

## 2018-10-28 VITALS — DIASTOLIC BLOOD PRESSURE: 71 MMHG | SYSTOLIC BLOOD PRESSURE: 136 MMHG

## 2018-10-28 LAB — PLATELET COUNT, AUTOMATED: 156 K/UL (ref 150–450)

## 2018-10-28 RX ADMIN — METOPROLOL TARTRATE SCH MG: 50 TABLET, FILM COATED ORAL at 20:23

## 2018-10-28 RX ADMIN — DOCUSATE SODIUM SCH MG: 100 CAPSULE, LIQUID FILLED ORAL at 20:23

## 2018-10-28 RX ADMIN — DOCUSATE SODIUM SCH MG: 100 CAPSULE, LIQUID FILLED ORAL at 09:38

## 2018-10-28 RX ADMIN — HYDROCODONE BITARTRATE AND ACETAMINOPHEN PRN EACH: 7.5; 325 TABLET ORAL at 08:47

## 2018-10-28 RX ADMIN — HYDROCODONE BITARTRATE AND ACETAMINOPHEN PRN EACH: 7.5; 325 TABLET ORAL at 14:30

## 2018-10-28 RX ADMIN — METOPROLOL TARTRATE SCH MG: 50 TABLET, FILM COATED ORAL at 09:39

## 2018-10-28 RX ADMIN — NYSTATIN SCH APP: 100000 POWDER TOPICAL at 09:38

## 2018-10-28 RX ADMIN — FAMOTIDINE SCH MG: 20 TABLET, FILM COATED ORAL at 09:38

## 2018-10-28 RX ADMIN — FAMOTIDINE SCH MG: 20 TABLET, FILM COATED ORAL at 20:23

## 2018-10-28 RX ADMIN — NYSTATIN SCH GM: 100000 CREAM TOPICAL at 20:23

## 2018-10-28 RX ADMIN — ENOXAPARIN SODIUM SCH MG: 100 INJECTION SUBCUTANEOUS at 09:38

## 2018-10-28 RX ADMIN — HYDROCODONE BITARTRATE AND ACETAMINOPHEN PRN EACH: 7.5; 325 TABLET ORAL at 20:23

## 2018-10-28 RX ADMIN — NIFEDIPINE SCH MG: 30 TABLET, FILM COATED, EXTENDED RELEASE ORAL at 09:38

## 2018-10-28 NOTE — HOSPITALIST PROGRESS NOTE
Subjective


Progress Notes


Subjective


72F admitted for UTI and weakness. MIKALA overnight, weak and unable to make it to 


bathroom. Reports she needs help to care for self at home.





Patient Complains of:


Neurological:  No: Confusion


Gastrointestinal:  No Nausea, No Vomiting





Physical Exam





Vital Signs








  Date Time  Temp Pulse Resp B/P (MAP) Pulse Ox O2 Delivery O2 Flow Rate FiO2


 


10/28/18 07:23     92 Room Air  


 


10/28/18 07:23 98.0 75 14 152/94 (113)    














Intake and Output 


 


 10/28/18





 07:00


 


Intake Total 1154 ml


 


Output Total 750 ml


 


Balance 404 ml


 


 


 


Intake Oral 300 ml


 


IV Total 854 ml


 


Output Urine Total 750 ml








General Appearance:  Alert, Awake, No Acute Distress


Neuro:  No Gross deficits


Eyes:  PERRLA


ENT:  Normal


Neck:  No Masses


Cardiovascular:  Normal Rhythm & Peripheral Pulses


Respiratory:  No Respiratory Distress


GI:  Soft and Non-Tender


Musculoskeletal:  No Weakness/Pain


Extremities:  Soft and Non Tender, Pulses, Perfused, Edema


Integumentary:  Other (dried shingles lesions L jaw and occiput/temporal area)


Psych:  Alert & Oriented X3


Result Diagram:  


10/28/18 0554                                                                   


            10/28/18 0548








Assessment and Plan


Problems:  


(1) UTI (urinary tract infection)


Status:  Acute


Assessment & Plan:  Recurrent. She has had several infections over the past 


year. Based on sensitivities of past organisms, will start on IV Rocephin. 


Culture has been obtained in the ER. This may be complicated by her history of 


renal stones with probable residual stones on left side. May need to have u


rology see her again.





(2) Dehydration


Status:  Acute


Assessment & Plan:  Improved, monitor PO intake and UOP.





(3) Herpes zoster


Status:  Acute


Assessment & Plan:  Left C2-C3 dermatomes. Will continue the Valtrex 1gm PO TID 


through 11.02.2018 





(4) HTN (hypertension)


Status:  Chronic


Assessment & Plan:  Will monitor BPs and resume her metoprolol and nifedipine 


with parameters.





(5) Candidiasis of perineum


Status:  Acute


Assessment & Plan:  Le cath due to incontinence, use nystatin topical, try to


keep clean/dry.





(6) Weakness generalized


Status:  Acute


Assessment & Plan:  Most likely due to the multiple medical problems. Will treat


the UTI, dehydration, zoster. Will have PT/OT see. She may need more help at 


home if she hopes to stay at least semi-independent. Will have Social Work see.








Exam


Sepsis Risk:  No Definite Risk





Problem Qualifiers





(1) UTI (urinary tract infection):  


Urinary tract infection type:  acute cystitis  Hematuria presence:  without 


hematuria  Qualified Codes:  N30.00 - Acute cystitis without hematuria








KACEY HOLGUIN DO       Oct 28, 2018 11:07

## 2018-10-29 VITALS — SYSTOLIC BLOOD PRESSURE: 137 MMHG | DIASTOLIC BLOOD PRESSURE: 98 MMHG

## 2018-10-29 VITALS — SYSTOLIC BLOOD PRESSURE: 158 MMHG | DIASTOLIC BLOOD PRESSURE: 80 MMHG

## 2018-10-29 VITALS — DIASTOLIC BLOOD PRESSURE: 87 MMHG | SYSTOLIC BLOOD PRESSURE: 84 MMHG

## 2018-10-29 VITALS — DIASTOLIC BLOOD PRESSURE: 84 MMHG | SYSTOLIC BLOOD PRESSURE: 158 MMHG

## 2018-10-29 RX ADMIN — METOPROLOL TARTRATE SCH MG: 50 TABLET, FILM COATED ORAL at 20:21

## 2018-10-29 RX ADMIN — ENOXAPARIN SODIUM SCH MG: 100 INJECTION SUBCUTANEOUS at 10:14

## 2018-10-29 RX ADMIN — NIFEDIPINE SCH MG: 30 TABLET, FILM COATED, EXTENDED RELEASE ORAL at 09:00

## 2018-10-29 RX ADMIN — NYSTATIN SCH GM: 100000 CREAM TOPICAL at 20:22

## 2018-10-29 RX ADMIN — DOCUSATE SODIUM SCH MG: 100 CAPSULE, LIQUID FILLED ORAL at 20:21

## 2018-10-29 RX ADMIN — FAMOTIDINE SCH MG: 20 TABLET, FILM COATED ORAL at 10:14

## 2018-10-29 RX ADMIN — HYDROCODONE BITARTRATE AND ACETAMINOPHEN PRN EACH: 7.5; 325 TABLET ORAL at 06:25

## 2018-10-29 RX ADMIN — HYDROCODONE BITARTRATE AND ACETAMINOPHEN PRN EACH: 7.5; 325 TABLET ORAL at 20:21

## 2018-10-29 RX ADMIN — DOCUSATE SODIUM SCH MG: 100 CAPSULE, LIQUID FILLED ORAL at 09:00

## 2018-10-29 RX ADMIN — FAMOTIDINE SCH MG: 20 TABLET, FILM COATED ORAL at 20:21

## 2018-10-29 RX ADMIN — METOPROLOL TARTRATE SCH MG: 50 TABLET, FILM COATED ORAL at 10:13

## 2018-10-29 RX ADMIN — NYSTATIN SCH GM: 100000 CREAM TOPICAL at 10:13

## 2018-10-29 NOTE — ANTIMICROBIAL STEWARDSHIP
Antimicrobial Stewardship


Empiricly appropriate:  Yes


Significant PMH:  Yes


Support empiric regimen:  Yes


Comment


History of UTIs, recent lithotripsy with stones on 10/18/18, d/c'd on cipro x 10


days (was on cipro with current UA/culture)


Approriate Cultures done:  Yes


Determine cumulative duration:  Consulted with Dr. Mina to determine further 


treatment


Determine standard duration:  10-14d with stones


Comment


73 yo F with kidney stones s/p lithotripsy on 10/18/18, was on cipro at home 


prior to admission.  UA showed increased WBC, urine cx is negative.  Plan to 


consult urology regarding antibiotics. Continue Ceftriaxone 2g IV q24h until 


consult with urology.  





Sharee Calix, PharmD, BCOP











SHAREE CALIX               Oct 29, 2018 15:46

## 2018-10-29 NOTE — HOSPITALIST PROGRESS NOTE
Subjective


Progress Notes


Subjective


She reports feeling much improved.





Physical Exam





Vital Signs








  Date Time  Temp Pulse Resp B/P (MAP) Pulse Ox O2 Delivery O2 Flow Rate FiO2


 


10/29/18 06:21 98.3 66 18 158/84 (108) 91 Nasal Cannula 2.0 














Intake and Output 


 


 10/29/18





 07:00


 


Intake Total 200 ml


 


Output Total 825 ml


 


Balance -625 ml


 


 


 


Intake Oral 200 ml


 


Output Urine Total 825 ml


 


# Bowel Movements 1








General Appearance:  Alert, Awake


Integumentary:  Other (zoster essentially unchanged left C2-C3 


dermatomes/perineum and inguinal areas much less erythematous)


Psych:  Alert & Oriented X3


Result Diagram:  


10/28/18 0554                                                                   


            10/29/18 0541








Assessment and Plan


Problems:  


(1) UTI (urinary tract infection)


Status:  Acute


Assessment & Plan:  Recurrent. She has had several infections over the past 


year. Based on sensitivities of past organisms, we started her on IV Rocephin. 


Culture obtained in the ER is negative, but she had been on Cipro since her 


lithotripsy on 10/18/18. The UTI may be complicated by her history of renal s


tones with probable residual stones on left side. May need to have urology see 


her again.





(2) Dehydration


Status:  Acute


Assessment & Plan:  Improved with IV fluids and oral intake. Watch labs/UOP.





(3) Acute renal failure


Status:  Acute


Assessment & Plan:  Improved. Creatinine is now 1.3 (down from 2.0 at time of 


admission). Most likely due to dehydration and acute infection. 





(4) Herpes zoster


Status:  Acute


Assessment & Plan:  Left C2-C3 dermatomes. Will continue the Valtrex 1gm PO TID 


through 11/02/2018 





(5) HTN (hypertension)


Status:  Chronic


Assessment & Plan:  Will monitor BPs and resume her metoprolol and nifedipine 


with parameters.





(6) Candidiasis of perineum


Status:  Acute


Assessment & Plan:  Improved. Le cath placed due to incontinence, continue 


nystatin topical, try to keep clean/dry.





(7) Weakness generalized


Status:  Acute


Assessment & Plan:  Improved. Most likely due to the multiple medical problems -


UTI, dehydration, zoster. PT/OT  to see. She may need more help at home if she 


hopes to stay at least semi-independent. Will have Social Work see.





(8) Obesity, morbid, BMI 50 or higher


Status:  Chronic





Exam


Sepsis Risk:  No Definite Risk





Problem Qualifiers





(1) UTI (urinary tract infection):  


Urinary tract infection type:  acute cystitis  Hematuria presence:  without 


hematuria  Qualified Codes:  N30.00 - Acute cystitis without hematuria








KEYLA VAIL MD            Oct 29, 2018 10:48

## 2018-10-30 VITALS — DIASTOLIC BLOOD PRESSURE: 73 MMHG | SYSTOLIC BLOOD PRESSURE: 146 MMHG

## 2018-10-30 VITALS — SYSTOLIC BLOOD PRESSURE: 168 MMHG | DIASTOLIC BLOOD PRESSURE: 92 MMHG

## 2018-10-30 VITALS — SYSTOLIC BLOOD PRESSURE: 159 MMHG | DIASTOLIC BLOOD PRESSURE: 91 MMHG

## 2018-10-30 VITALS — DIASTOLIC BLOOD PRESSURE: 80 MMHG | SYSTOLIC BLOOD PRESSURE: 155 MMHG

## 2018-10-30 LAB — PLATELET COUNT, AUTOMATED: 146 K/UL (ref 150–450)

## 2018-10-30 RX ADMIN — ENOXAPARIN SODIUM SCH MG: 100 INJECTION SUBCUTANEOUS at 09:36

## 2018-10-30 RX ADMIN — NYSTATIN SCH GM: 100000 CREAM TOPICAL at 09:35

## 2018-10-30 RX ADMIN — FAMOTIDINE SCH MG: 20 TABLET, FILM COATED ORAL at 20:20

## 2018-10-30 RX ADMIN — DOCUSATE SODIUM SCH MG: 100 CAPSULE, LIQUID FILLED ORAL at 09:35

## 2018-10-30 RX ADMIN — METOPROLOL TARTRATE SCH MG: 50 TABLET, FILM COATED ORAL at 09:36

## 2018-10-30 RX ADMIN — HYDROCODONE BITARTRATE AND ACETAMINOPHEN PRN EACH: 7.5; 325 TABLET ORAL at 20:25

## 2018-10-30 RX ADMIN — DOCUSATE SODIUM SCH MG: 100 CAPSULE, LIQUID FILLED ORAL at 20:21

## 2018-10-30 RX ADMIN — HYDROCODONE BITARTRATE AND ACETAMINOPHEN PRN EACH: 7.5; 325 TABLET ORAL at 02:50

## 2018-10-30 RX ADMIN — NIFEDIPINE SCH MG: 30 TABLET, FILM COATED, EXTENDED RELEASE ORAL at 09:53

## 2018-10-30 RX ADMIN — NYSTATIN SCH GM: 100000 CREAM TOPICAL at 20:20

## 2018-10-30 RX ADMIN — METOPROLOL TARTRATE SCH MG: 50 TABLET, FILM COATED ORAL at 20:21

## 2018-10-30 RX ADMIN — FAMOTIDINE SCH MG: 20 TABLET, FILM COATED ORAL at 09:36

## 2018-10-30 NOTE — HOSPITALIST PROGRESS NOTE
Subjective


Progress Notes


Subjective


Still feeling a bit weak.  Le catheter in place.  Skin fold candida and skin 


breakdown improving per staff.





Physical Exam





Vital Signs








  Date Time  Temp Pulse Resp B/P (MAP) Pulse Ox O2 Delivery O2 Flow Rate FiO2


 


10/30/18 07:31     92 Room Air  


 


10/30/18 07:16 98.2 53 20 168/92 (117)    


 


10/30/18 02:49       2.0 














Intake and Output 


 


 10/30/18





 07:00


 


Intake Total 1330 ml


 


Output Total 700 ml


 


Balance 630 ml


 


 


 


Intake Oral 1330 ml


 


Output Urine Total 700 ml


 


# Bowel Movements 1








General Appearance:  Alert, Awake, No Acute Distress


GI:  Soft and Non-Tender


Integumentary:  Other (mild erythema in skin folds in perineal area and abdomen.


 Also, some skin breakdown.  Scabed lesions on left neck below chin and into the


scalp on the left)


Result Diagram:  


10/30/18 0538                                                                   


            10/30/18 0538








Assessment and Plan


Problems:  


(1) UTI (urinary tract infection)


Status:  Acute


Assessment & Plan:  Recurrent. She has had several infections over the past 


year. Based on sensitivities of past organisms, we started her on IV Rocephin. 


Culture obtained in the ER is negative, but she had been on Cipro since her 


lithotripsy on 10/18/18. The UTI may be complicated by her history of renal 


stones with probable residual stones on left side. May need to have urology see 


her again.





(2) Dehydration


Status:  Acute


Assessment & Plan:  Improved with IV fluids and oral intake. Watch labs/UOP.





(3) Acute renal failure


Status:  Acute


Assessment & Plan:  Improved. Creatinine is now 1.4 (down from 2.0 at time of 


admission). Most likely due to dehydration and acute infection.  Saline locked.





(4) Candidiasis of perineum


Status:  Acute


Assessment & Plan:  Improved. Le cath placed due to incontinence, continue 


nystatin topical, try to keep clean/dry.  Staff doing daily wound care to areas 


of skin breakdown.  The patient reports difficulty doing own skin care.





(5) Herpes zoster


Status:  Acute


Assessment & Plan:  Left C2-C3 dermatomes. Most lesions appear scabbed over.  No


obvious vesicles.  Will continue the Valtrex 1gm PO TID through 11/02/2018 





(6) Weakness generalized


Status:  Acute


Assessment & Plan:  Improved. Most likely due to the multiple medical problems -


UTI, dehydration, zoster. PT/OT  to see. She may need to go to ECF or more help 


at home if she hopes to stay at least semi-independent. Will have Social Work 


see.





(7) HTN (hypertension)


Status:  Chronic


Assessment & Plan:  Will monitor BPs and resume her metoprolol and nifedipine 


with parameters.





(8) Obesity, morbid, BMI 50 or higher


Status:  Chronic





Exam


Sepsis Risk:  No Definite Risk





Problem Qualifiers





(1) UTI (urinary tract infection):  


Urinary tract infection type:  acute cystitis  Hematuria presence:  without 


hematuria  Qualified Codes:  N30.00 - Acute cystitis without hematuria








RIKA VEGA MD               Oct 30, 2018 13:22

## 2018-10-31 VITALS — SYSTOLIC BLOOD PRESSURE: 146 MMHG | DIASTOLIC BLOOD PRESSURE: 84 MMHG

## 2018-10-31 VITALS — SYSTOLIC BLOOD PRESSURE: 137 MMHG | DIASTOLIC BLOOD PRESSURE: 76 MMHG

## 2018-10-31 RX ADMIN — FAMOTIDINE SCH MG: 20 TABLET, FILM COATED ORAL at 09:06

## 2018-10-31 RX ADMIN — DOCUSATE SODIUM SCH MG: 100 CAPSULE, LIQUID FILLED ORAL at 09:07

## 2018-10-31 RX ADMIN — HYDROCODONE BITARTRATE AND ACETAMINOPHEN PRN EACH: 7.5; 325 TABLET ORAL at 13:47

## 2018-10-31 RX ADMIN — ENOXAPARIN SODIUM SCH MG: 100 INJECTION SUBCUTANEOUS at 09:06

## 2018-10-31 RX ADMIN — NIFEDIPINE SCH MG: 30 TABLET, FILM COATED, EXTENDED RELEASE ORAL at 09:06

## 2018-10-31 RX ADMIN — METOPROLOL TARTRATE SCH MG: 50 TABLET, FILM COATED ORAL at 09:06

## 2018-10-31 RX ADMIN — HYDROCODONE BITARTRATE AND ACETAMINOPHEN PRN EACH: 7.5; 325 TABLET ORAL at 05:19

## 2018-10-31 RX ADMIN — NYSTATIN SCH GM: 100000 CREAM TOPICAL at 09:07

## 2018-10-31 NOTE — HOSPITALIST DEPART
Discharge Summary


Reason for Hosp/Final Diag:  


(1) UTI (urinary tract infection)


Status:  Acute


Hospital Course & Plan:  Her initial urinalysis showed moderate leukocytes and 


elevated WBC.  She was started empirically on ceftriaxone.  Her culture from the


emergency room was negative, but she had been on ciprofloxacin as an outpatient.


 Antibiotics were discontinued on 10/29.  





(2) Dehydration


Status:  Acute


Hospital Course & Plan:  Resolved with IV fluids.





(3) Acute renal failure


Status:  Acute


Hospital Course & Plan:  Resolved with IV fluids.





(4) Candidiasis of perineum


Status:  Acute


Hospital Course & Plan:  She has been placed on topical Nystatin.





(5) Herpes zoster


Status:  Acute


Hospital Course & Plan:  She had been on valacyclovir prior to admission.  Her 


lesions have all scabbed over.  She complete her prior prescription.





(6) Weakness generalized


Status:  Acute


Hospital Course & Plan:  She was evaluated by therapy, and will continued rehab 


through home health.





(7) HTN (hypertension)


Status:  Chronic


Hospital Course & Plan:  She is on chronic treatment with metoprolol and 


nifedipine.





(8) Obesity, morbid, BMI 50 or higher


Status:  Chronic


Departure


Latest Vital Signs





Vital Signs








 10/31/18 10/31/18





 05:08 08:57


 


Temp  97.3


 


Pulse  64


 


Resp  16


 


B/P (MAP)  146/84 (104)


 


Pulse Ox  95


 


O2 Delivery  Room Air


 


O2 Flow Rate 2.0 








Weight (Pounds):  293


Weight (Ounces):  7.0


Result Diagram:  


10/30/18 0538                                                                   


            10/30/18 0538





Condition:  Improved


Discharge:  Home, Home Health


PT/OT Follow Up For:  PT For Strengthening


Home Health RN Follow Up For:  Medication Management, Nursing Assessment


Home Health CNA Follow Up For:  ADL Assistance





Discharge Instructions


Home Meds


Active Scripts


NYSTATIN [id number] UNT/ML Topical Cream (NYSTATIN 153591 UNT/ML Topical Cream) 15 


Gm Cream..g., 0 GM TP BID, #15 G


   Prov:RIP GARCIA DO         10/31/18


Valacyclovir Hcl (VALTREX) 1,000 Mg Tablet, 1000 MG PO TID, #20 TAB


   Prov:CHLOE BAKER         10/26/18


Promethazine Hcl (PROMETHAZINE HCL) 25 Mg Tablet, 25 MG PO Q8H PRN for 


NAUSEA/VOMITING, #20 TAB 0 Refills


   Prov:JAVY MARY MD         8/3/18


Nifedipine (PROCARDIA XL) 30 Mg Tab.er.24, 30 MG PO QDAY, #30 TAB


   Prov:RIP GARCIA DO         6/17/18


Reported Medications


Hydrocodone Bit/Acetaminophen (NORCO 7.5-325 TABLET) 1 Each Tablet, 1 EACH PO 


Q4-6H PRN for PAIN, #30


   10/18/18


Famotidine (FAMOTIDINE) 20 Mg Tablet, 20 MG PO BID, #20 TAB


   10/18/18


Hydroxyzine Hcl (HYDROXYZINE HCL) 50 Mg Tablet, 50 MG PO TID PRN for ANXIETY


   10/18/18


Solifenacin Succinate (VESICARE) 10 Mg Tablet, 10 MG PO QDAY


   10/15/18


Phenazopyridine Hcl (PHENAZOPYRIDINE HCL) 200 Mg Tablet, 200 MG PO TID PRN for 


BURNING WITH URINATION, TAB


   8/27/18


Metoprolol Tartrate (Metoprolol Tartrate) 100 Mg Tablet, 100 MG PO BID, #60 0 


Refills


   5/18/11


Discontinued Reported Medications


Ibuprofen (IBUPROFEN) 800 Mg Tablet, 1 TAB PO TID PRN for PAIN, #50 TAB


   10/18/18


Ciprofloxacin Hcl (CIPROFLOXACIN HCL) 500 Mg Tablet, 500 MG PO Q12H for 10 Days,


#20 TAB


   10/18/18


Chlorpheniramine/Dextromethorp (Eql Cough-Cold Relief Hbp Tab) 4 Mg-30 Mg 


Tablet, 1 TAB PO PRN PRN for CONGESTION


   8/21/18


Docusate Sodium (COLACE) 100 Mg Capsule, 100 MG PO PRN, #30 CAPSULE


   7/19/18


Discontinued Scripts


Hydrocodone Bit/Acetaminophen (HYDROCODON-ACETAMINOPHEN 5-325) 1 Each Tablet, 1 


EACH PO Q4-6H PRN for PAIN, #12 TAB


   Prov:OLIVIACHLOE FNP         10/26/18


Diet:  Regular


Activity:  As Tolerated


Copies to:   AIMEE LIN MD; HIEU FERNANDO DO ;





Venous Thromboembolism


Antithrombotics


Is Pt On Any Antithrombotics?:  Yes





Face-to-Face Certification


Face to Face


Home Health Certification


Institutional Provider conducted the face-to-face encounter.


Electronic Undersigning Physician Certifies Home Health.  


I certify that the patient has been under my care and that I had a face-to-face 


encounter that meets the physician face-to-face encounter requirements with this


patient.  


This patient is home-bound due to safety issues and continues to require 


assistance with ADL's.


I certify that based on my findings, that Nursing, Aides and the following Home 


Health services are medically necessary:


Medical Necessity:  Nursing, Rehab


Date Face to Face Conducted:  Oct 31, 2018





Problem Qualifiers





(1) UTI (urinary tract infection):  


Urinary tract infection type:  acute cystitis  Hematuria presence:  without hema


turia  Qualified Codes:  N30.00 - Acute cystitis without hematuria








RIP GARCIA DO                  Oct 31, 2018 10:41

## 2018-11-07 ENCOUNTER — HOSPITAL ENCOUNTER (OUTPATIENT)
Dept: HOSPITAL 89 - ZZSENDIN | Age: 73
End: 2018-11-07
Attending: UROLOGY
Payer: MEDICARE

## 2018-11-07 VITALS — BODY MASS INDEX: 50.29 KG/M2

## 2018-11-07 DIAGNOSIS — B96.89: ICD-10-CM

## 2018-11-07 DIAGNOSIS — N30.01: Primary | ICD-10-CM

## 2018-11-07 PROCEDURE — 87088 URINE BACTERIA CULTURE: CPT

## 2018-11-07 PROCEDURE — 81001 URINALYSIS AUTO W/SCOPE: CPT

## 2018-12-04 ENCOUNTER — HOSPITAL ENCOUNTER (INPATIENT)
Dept: HOSPITAL 89 - ER | Age: 73
LOS: 6 days | Discharge: HOME HEALTH | DRG: 690 | End: 2018-12-10
Attending: INTERNAL MEDICINE | Admitting: INTERNAL MEDICINE
Payer: MEDICARE

## 2018-12-04 VITALS — DIASTOLIC BLOOD PRESSURE: 93 MMHG | SYSTOLIC BLOOD PRESSURE: 161 MMHG

## 2018-12-04 VITALS
BODY MASS INDEX: 50.02 KG/M2 | WEIGHT: 293 LBS | HEIGHT: 64 IN | WEIGHT: 293 LBS | HEIGHT: 64 IN | BODY MASS INDEX: 50.02 KG/M2

## 2018-12-04 VITALS — SYSTOLIC BLOOD PRESSURE: 155 MMHG | DIASTOLIC BLOOD PRESSURE: 99 MMHG

## 2018-12-04 VITALS — DIASTOLIC BLOOD PRESSURE: 86 MMHG | SYSTOLIC BLOOD PRESSURE: 160 MMHG

## 2018-12-04 DIAGNOSIS — Z87.442: ICD-10-CM

## 2018-12-04 DIAGNOSIS — Z91.040: ICD-10-CM

## 2018-12-04 DIAGNOSIS — B96.20: ICD-10-CM

## 2018-12-04 DIAGNOSIS — Z88.2: ICD-10-CM

## 2018-12-04 DIAGNOSIS — F41.9: ICD-10-CM

## 2018-12-04 DIAGNOSIS — N18.3: ICD-10-CM

## 2018-12-04 DIAGNOSIS — B37.2: ICD-10-CM

## 2018-12-04 DIAGNOSIS — E66.01: ICD-10-CM

## 2018-12-04 DIAGNOSIS — G89.29: ICD-10-CM

## 2018-12-04 DIAGNOSIS — I12.9: ICD-10-CM

## 2018-12-04 DIAGNOSIS — N39.0: Primary | ICD-10-CM

## 2018-12-04 DIAGNOSIS — R53.1: ICD-10-CM

## 2018-12-04 LAB
INR PPP: 0.95
PLATELET COUNT, AUTOMATED: 222 K/UL (ref 150–450)

## 2018-12-04 PROCEDURE — 96374 THER/PROPH/DIAG INJ IV PUSH: CPT

## 2018-12-04 PROCEDURE — 84295 ASSAY OF SERUM SODIUM: CPT

## 2018-12-04 PROCEDURE — 84155 ASSAY OF PROTEIN SERUM: CPT

## 2018-12-04 PROCEDURE — 96361 HYDRATE IV INFUSION ADD-ON: CPT

## 2018-12-04 PROCEDURE — 97162 PT EVAL MOD COMPLEX 30 MIN: CPT

## 2018-12-04 PROCEDURE — 82310 ASSAY OF CALCIUM: CPT

## 2018-12-04 PROCEDURE — 87088 URINE BACTERIA CULTURE: CPT

## 2018-12-04 PROCEDURE — 72148 MRI LUMBAR SPINE W/O DYE: CPT

## 2018-12-04 PROCEDURE — 96376 TX/PRO/DX INJ SAME DRUG ADON: CPT

## 2018-12-04 PROCEDURE — 85730 THROMBOPLASTIN TIME PARTIAL: CPT

## 2018-12-04 PROCEDURE — 84132 ASSAY OF SERUM POTASSIUM: CPT

## 2018-12-04 PROCEDURE — 82247 BILIRUBIN TOTAL: CPT

## 2018-12-04 PROCEDURE — 85025 COMPLETE CBC W/AUTO DIFF WBC: CPT

## 2018-12-04 PROCEDURE — 80202 ASSAY OF VANCOMYCIN: CPT

## 2018-12-04 PROCEDURE — 87186 SC STD MICRODIL/AGAR DIL: CPT

## 2018-12-04 PROCEDURE — 76705 ECHO EXAM OF ABDOMEN: CPT

## 2018-12-04 PROCEDURE — 82374 ASSAY BLOOD CARBON DIOXIDE: CPT

## 2018-12-04 PROCEDURE — 84460 ALANINE AMINO (ALT) (SGPT): CPT

## 2018-12-04 PROCEDURE — 99285 EMERGENCY DEPT VISIT HI MDM: CPT

## 2018-12-04 PROCEDURE — 82947 ASSAY GLUCOSE BLOOD QUANT: CPT

## 2018-12-04 PROCEDURE — 81001 URINALYSIS AUTO W/SCOPE: CPT

## 2018-12-04 PROCEDURE — 82435 ASSAY OF BLOOD CHLORIDE: CPT

## 2018-12-04 PROCEDURE — 84075 ASSAY ALKALINE PHOSPHATASE: CPT

## 2018-12-04 PROCEDURE — 96375 TX/PRO/DX INJ NEW DRUG ADDON: CPT

## 2018-12-04 PROCEDURE — 84520 ASSAY OF UREA NITROGEN: CPT

## 2018-12-04 PROCEDURE — 97166 OT EVAL MOD COMPLEX 45 MIN: CPT

## 2018-12-04 PROCEDURE — 83605 ASSAY OF LACTIC ACID: CPT

## 2018-12-04 PROCEDURE — 36415 COLL VENOUS BLD VENIPUNCTURE: CPT

## 2018-12-04 PROCEDURE — 87040 BLOOD CULTURE FOR BACTERIA: CPT

## 2018-12-04 PROCEDURE — 82565 ASSAY OF CREATININE: CPT

## 2018-12-04 PROCEDURE — 85610 PROTHROMBIN TIME: CPT

## 2018-12-04 PROCEDURE — 87077 CULTURE AEROBIC IDENTIFY: CPT

## 2018-12-04 PROCEDURE — 84450 TRANSFERASE (AST) (SGOT): CPT

## 2018-12-04 PROCEDURE — 82040 ASSAY OF SERUM ALBUMIN: CPT

## 2018-12-04 PROCEDURE — 72120 X-RAY BEND ONLY L-S SPINE: CPT

## 2018-12-04 PROCEDURE — 84484 ASSAY OF TROPONIN QUANT: CPT

## 2018-12-04 RX ADMIN — METOPROLOL TARTRATE SCH MG: 50 TABLET, FILM COATED ORAL at 21:24

## 2018-12-04 RX ADMIN — GABAPENTIN SCH MG: 300 CAPSULE ORAL at 21:24

## 2018-12-04 NOTE — ER REPORT
History and Physical


Time Seen By MD:  14:50


Hx. of Stated Complaint:  


left kidney pain,


HPI/ROS


CHIEF COMPLAINT: Kidney pain





HISTORY OF PRESENT ILLNESS: 72-year-old female with history of multiple episodes


of UTI, Pyelo, kidney stones, status post lithotripsy, presents with pain, 


chills that began this a.m. and continue, dysuria without hematuria. + hx 


cholecystectomy. Denies cp, sob, ap, cough, n/v/d/c. Denies le pain or swelling.


Denies ha + fevers/chills





REVIEW OF SYSTEMS:


Constitutional: above


Eyes: No discharge.


ENT: No sore throat. 


Cardiovascular: No chest pain, no palpitations.


Respiratory: No cough, no shortness of breath.


Gastrointestinal: No abdominal pain, no vomiting. + flank pain


Genitourinary: No hematuria.


Musculoskeletal: + l flank pain


Skin: No rashes.


Neurological: No headache. + chills


Remainder of the 14 system rev:  Yes


Allergies:  


Coded Allergies:  


     latex (Verified  Allergy, Severe, RASH, 12/4/18)


   


   PEELS SKIN OFF


     Sulfa (Sulfonamide Antibiotics) (Verified  Allergy, Unknown, HIVES, 


12/4/18)


Home Meds


Active Scripts


NYSTATIN [id number] UNT/ML Topical Cream (NYSTATIN 256500 UNT/ML Topical Cream) 15 


Gm Cream..g., 0 GM TP BID, #15 G


   Prov:RIP GARCIA DO         10/31/18


Promethazine Hcl (PROMETHAZINE HCL) 25 Mg Tablet, 25 MG PO Q8H PRN for 


NAUSEA/VOMITING, #20 TAB 0 Refills


   Prov:JAVY MARY MD         8/3/18


Nifedipine (PROCARDIA XL) 30 Mg Tab.er.24, 30 MG PO QDAY, #30 TAB


   Prov:RIP GARCIA DO         6/17/18


Reported Medications


Hydroxyzine Hcl (HYDROXYZINE HCL) 50 Mg Tablet, 50 MG PO TID PRN for ANXIETY


   10/18/18


Metoprolol Tartrate (Metoprolol Tartrate) 100 Mg Tablet, 100 MG PO BID, #60 0 


Refills


   5/18/11


Discontinued Reported Medications


Hydrocodone Bit/Acetaminophen (NORCO 7.5-325 TABLET) 1 Each Tablet, 1 EACH PO 


Q4-6H PRN for PAIN, #30


   10/18/18


Famotidine (FAMOTIDINE) 20 Mg Tablet, 20 MG PO BID, #20 TAB


   10/18/18


Solifenacin Succinate (VESICARE) 10 Mg Tablet, 10 MG PO QDAY


   10/15/18


Phenazopyridine Hcl (PHENAZOPYRIDINE HCL) 200 Mg Tablet, 200 MG PO TID PRN for 


BURNING WITH URINATION, TAB


   8/27/18


Discontinued Scripts


Valacyclovir Hcl (VALTREX) 1,000 Mg Tablet, 1000 MG PO TID, #20 TAB


   Prov:CHLOE BAKER FNP         10/26/18


Reviewed Nurses Notes:  Yes


Old Medical Records Reviewed:  Yes


Hx Smoking:  No


Smoking Status:  Never Smoker


Hx Substance Use Disorder:  No


Hx Alcohol Use:  No


Constitutional





Vital Sign - Last 24 Hours








 12/4/18 12/4/18 12/4/18 12/4/18





 14:55 15:06 15:12 15:30


 


Temp 100.9   99.8


 


Pulse 83  79 


 


Resp 24   


 


B/P (MAP) 187/116 183/88 (119)  181/85 (117)


 


Pulse Ox 92  95 


 


O2 Delivery Room Air   


 


    





 12/4/18 12/4/18 12/4/18 12/4/18





 15:35 16:00 16:05 16:08


 


Pulse 76  85 


 


B/P (MAP)  175/91 (119)  


 


Pulse Ox 90  86 


 


O2 Delivery   Room Air 


 


O2 Flow Rate    2.0





 12/4/18   





 16:08   


 


Pulse Ox 92   


 


O2 Delivery Nasal Cannula   


 


O2 Flow Rate 3   








Physical Exam


   General Appearance: The patient is alert, has no immediate need for airway 


protection and no signs of toxicity. 


Eyes: Pupils equal and round no pallor or injection.


ENT, Mouth: Mucous membranes are moist.


Respiratory: There are no retractions, lungs are clear to auscultation.


Cardiovascular: Regular rate and rhythm. 


Gastrointestinal:  Abdomen is soft and non tender, no masses, bowel sounds 


normal.


Neurological: alert, oriented, moves all ext


Skin: cool and dry.


Musculoskeletal:  


      Extremities are nontender, nonswollen and have full range of motion.


Left cvat


shaking chills





DIFFERENTIAL DIAGNOSIS: After history and physical exam differential diagnosis 


was considered for sepsis, neurologic etiology, other emergent etiology





Medical Decision Making


Data Points


Result Diagram:  


12/4/18 1500                                                                    


           12/4/18 1500





Laboratory





Hematology








Test


 12/4/18


15:00 12/4/18


15:02 12/4/18


15:33


 


Red Blood Count


 4.72 M/uL


(4.17-5.56) 


 





 


Mean Corpuscular Volume


 91.6 fL


(80.0-96.0) 


 





 


Mean Corpuscular Hemoglobin


 30.6 pg


(26.0-33.0) 


 





 


Mean Corpuscular Hemoglobin


Concent 33.4 g/dL


(32.0-36.0) 


 





 


Red Cell Distribution Width


 15.0 %


(11.5-14.5) 


 





 


Mean Platelet Volume


 9.0 fL


(7.2-11.1) 


 





 


Neutrophils (%) (Auto)


 80.8 %


(39.4-72.5) 


 





 


Lymphocytes (%) (Auto)


 10.6 %


(17.6-49.6) 


 





 


Monocytes (%) (Auto)


 5.9 %


(4.1-12.4) 


 





 


Eosinophils (%) (Auto)


 2.0 %


(0.4-6.7) 


 





 


Basophils (%) (Auto)


 0.7 %


(0.3-1.4) 


 





 


Nucleated RBC Relative Count


(auto) 0.1 /100WBC 


 


 





 


Neutrophils # (Auto)


 8.0 K/uL


(2.0-7.4) 


 





 


Lymphocytes # (Auto)


 1.1 K/uL


(1.3-3.6) 


 





 


Monocytes # (Auto)


 0.6 K/uL


(0.3-1.0) 


 





 


Eosinophils # (Auto)


 0.2 K/uL


(0.0-0.5) 


 





 


Basophils # (Auto)


 0.1 K/uL


(0.0-0.1) 


 





 


Nucleated RBC Absolute Count


(auto) 0.01 K/uL 


 


 





 


Peripheral Blood Smear No Y/N   


 


Prothrombin Time


 12.7 seconds


(12.0-14.4) 


 





 


Prothromb Time International


Ratio 0.95 


 


 





 


Activated Partial


Thromboplast Time 29 seconds


(23-35) 


 





 


Sodium Level


 141 mmol/L


(137-145) 


 





 


Potassium Level


 4.0 mmol/L


(3.5-5.0) 


 





 


Chloride Level


 111 mmol/L


() 


 





 


Carbon Dioxide Level


 19 mmol/L


(22-31) 


 





 


Blood Urea Nitrogen


 21 mg/dl


(7-18) 


 





 


Creatinine


 1.40 mg/dl


(0.52-1.04) 


 





 


Glomerular Filtration Rate


Calc 37.1 


 


 





 


Random Glucose


 119 mg/dl


() 


 





 


Calcium Level


 10.1 mg/dl


(8.4-10.2) 


 





 


Total Bilirubin


 0.5 mg/dl


(0.2-1.3) 


 





 


Aspartate Amino Transf


(AST/SGOT) 34 U/L (0-35) 


 


 





 


Alanine Aminotransferase


(ALT/SGPT) 30 U/L (0-56) 


 


 





 


Alkaline Phosphatase


 103 U/L


(0-126) 


 





 


Troponin I < 0.012 ng/ml   


 


Total Protein


 8.4 g/dl


(6.3-8.2) 


 





 


Albumin


 4.1 g/dl


(3.5-5.0) 


 





 


Lactate


 


 1.6 mmol/L


(0.7-2.1) 





 


Urine Color   Yellow 


 


Urine Clarity   Cloudy 


 


Urine pH


 


 


 5.0 pH


(4.8-9.5)


 


Urine Specific Gravity   1.014 


 


Urine Protein


 


 


 Negative mg/dL


(NEGATIVE)


 


Urine Glucose (UA)


 


 


 Negative mg/dL


(NEGATIVE)


 


Urine Ketones


 


 


 Negative mg/dL


(NEGATIVE)


 


Urine Blood


 


 


 Small


(NEGATIVE)


 


Urine Nitrite


 


 


 Positive


(NEGATIVE)


 


Urine Bilirubin


 


 


 Negative


(NEGATIVE)


 


Urine Urobilinogen


 


 


 Negative mg/dL


(0.2-1.9)


 


Urine Leukocyte Esterase


 


 


 Large


(NEGATIVE)


 


Urine RBC


 


 


 9 /HPF


(0-2/HPF)


 


Urine WBC


 


 


 357 /HPF


(0-5/HPF)


 


Urine WBC Clumps   Many /HPF 


 


Urine Squamous Epithelial


Cells 


 


 Many /LPF


(NONE-FEW)


 


Urine Bacteria


 


 


 Moderate /HPF


(NONE-FEW)


 


Urine Mucus


 


 


 None /HPF


(NONE-FEW)








Chemistry








Test


 12/4/18


15:00 12/4/18


15:02 12/4/18


15:33


 


White Blood Count


 9.9 k/uL


(4.5-11.0) 


 





 


Red Blood Count


 4.72 M/uL


(4.17-5.56) 


 





 


Hemoglobin


 14.5 g/dL


(12.0-16.0) 


 





 


Hematocrit


 43.2 %


(34.0-47.0) 


 





 


Mean Corpuscular Volume


 91.6 fL


(80.0-96.0) 


 





 


Mean Corpuscular Hemoglobin


 30.6 pg


(26.0-33.0) 


 





 


Mean Corpuscular Hemoglobin


Concent 33.4 g/dL


(32.0-36.0) 


 





 


Red Cell Distribution Width


 15.0 %


(11.5-14.5) 


 





 


Platelet Count


 222 K/uL


(150-450) 


 





 


Mean Platelet Volume


 9.0 fL


(7.2-11.1) 


 





 


Neutrophils (%) (Auto)


 80.8 %


(39.4-72.5) 


 





 


Lymphocytes (%) (Auto)


 10.6 %


(17.6-49.6) 


 





 


Monocytes (%) (Auto)


 5.9 %


(4.1-12.4) 


 





 


Eosinophils (%) (Auto)


 2.0 %


(0.4-6.7) 


 





 


Basophils (%) (Auto)


 0.7 %


(0.3-1.4) 


 





 


Nucleated RBC Relative Count


(auto) 0.1 /100WBC 


 


 





 


Neutrophils # (Auto)


 8.0 K/uL


(2.0-7.4) 


 





 


Lymphocytes # (Auto)


 1.1 K/uL


(1.3-3.6) 


 





 


Monocytes # (Auto)


 0.6 K/uL


(0.3-1.0) 


 





 


Eosinophils # (Auto)


 0.2 K/uL


(0.0-0.5) 


 





 


Basophils # (Auto)


 0.1 K/uL


(0.0-0.1) 


 





 


Nucleated RBC Absolute Count


(auto) 0.01 K/uL 


 


 





 


Peripheral Blood Smear No Y/N   


 


Prothrombin Time


 12.7 seconds


(12.0-14.4) 


 





 


Prothromb Time International


Ratio 0.95 


 


 





 


Activated Partial


Thromboplast Time 29 seconds


(23-35) 


 





 


Glomerular Filtration Rate


Calc 37.1 


 


 





 


Calcium Level


 10.1 mg/dl


(8.4-10.2) 


 





 


Total Bilirubin


 0.5 mg/dl


(0.2-1.3) 


 





 


Aspartate Amino Transf


(AST/SGOT) 34 U/L (0-35) 


 


 





 


Alanine Aminotransferase


(ALT/SGPT) 30 U/L (0-56) 


 


 





 


Alkaline Phosphatase


 103 U/L


(0-126) 


 





 


Troponin I < 0.012 ng/ml   


 


Total Protein


 8.4 g/dl


(6.3-8.2) 


 





 


Albumin


 4.1 g/dl


(3.5-5.0) 


 





 


Lactate


 


 1.6 mmol/L


(0.7-2.1) 





 


Urine Color   Yellow 


 


Urine Clarity   Cloudy 


 


Urine pH


 


 


 5.0 pH


(4.8-9.5)


 


Urine Specific Gravity   1.014 


 


Urine Protein


 


 


 Negative mg/dL


(NEGATIVE)


 


Urine Glucose (UA)


 


 


 Negative mg/dL


(NEGATIVE)


 


Urine Ketones


 


 


 Negative mg/dL


(NEGATIVE)


 


Urine Blood


 


 


 Small


(NEGATIVE)


 


Urine Nitrite


 


 


 Positive


(NEGATIVE)


 


Urine Bilirubin


 


 


 Negative


(NEGATIVE)


 


Urine Urobilinogen


 


 


 Negative mg/dL


(0.2-1.9)


 


Urine Leukocyte Esterase


 


 


 Large


(NEGATIVE)


 


Urine RBC


 


 


 9 /HPF


(0-2/HPF)


 


Urine WBC


 


 


 357 /HPF


(0-5/HPF)


 


Urine WBC Clumps   Many /HPF 


 


Urine Squamous Epithelial


Cells 


 


 Many /LPF


(NONE-FEW)


 


Urine Bacteria


 


 


 Moderate /HPF


(NONE-FEW)


 


Urine Mucus


 


 


 None /HPF


(NONE-FEW)








Coagulation








Test


 12/4/18


15:00


 


Prothrombin Time 12.7 seconds 


 


Prothromb Time International


Ratio 0.95 





 


Activated Partial


Thromboplast Time 29 seconds 











Urinalysis








Test


 12/4/18


15:33


 


Urine Color Yellow 


 


Urine Clarity Cloudy 


 


Urine pH


 5.0 pH


(4.8-9.5)


 


Urine Specific Gravity 1.014 


 


Urine Protein


 Negative mg/dL


(NEGATIVE)


 


Urine Glucose (UA)


 Negative mg/dL


(NEGATIVE)


 


Urine Ketones


 Negative mg/dL


(NEGATIVE)


 


Urine Blood


 Small


(NEGATIVE)


 


Urine Nitrite


 Positive


(NEGATIVE)


 


Urine Bilirubin


 Negative


(NEGATIVE)


 


Urine Urobilinogen


 Negative mg/dL


(0.2-1.9)


 


Urine Leukocyte Esterase


 Large


(NEGATIVE)


 


Urine RBC


 9 /HPF


(0-2/HPF)


 


Urine WBC


 357 /HPF


(0-5/HPF)


 


Urine WBC Clumps Many /HPF 


 


Urine Squamous Epithelial


Cells Many /LPF


(NONE-FEW)


 


Urine Bacteria


 Moderate /HPF


(NONE-FEW)


 


Urine Mucus


 None /HPF


(NONE-FEW)











ED Course/Re-evaluation


ED Course


Patient presents with chills and left flank pain that she feels is like prior 


UTIs. She is afebrile with normal vitals, and labs are unremarkable including 


normal leukocytes and normal lactate. Her UA is consistent with UTI. Upon review


of multiple prior most event essentially pansensitive with the exception of the 


most recent UA. Therefore, I discussed with patient the potential for admission 


and observation with IV antibiotics versus discharge. She would prefer to 


discharge, so we will initiate first dose IV antibiotics and monitor her for a 


couple hours. At that time I will reassess and if she feels improved it is 


reasonable given the entirety of her presentation and findings to try an 


outpatient plan.





Depart


Departure


Latest Vital Signs





Vital Signs








  Date Time  Temp Pulse Resp B/P (MAP) Pulse Ox O2 Delivery O2 Flow Rate FiO2


 


12/4/18 16:08     92 Nasal Cannula 3 


 


12/4/18 16:05  85      


 


12/4/18 16:00    175/91 (119)    


 


12/4/18 15:30 99.8       


 


12/4/18 14:55   24     








Condition:  Stable


Disposition:  HOME OR SELF-CARE


Referrals:  


HIEU FERNANDO DO (PCP)











QUINN MELLO MD                 Dec 4, 2018 15:36

## 2018-12-04 NOTE — HISTORY & PHYSICAL
History of Present Illness


Chief Complaint


71yo female with h/o urethral stones and UTI's who came to the ER for left sided


flank pain and rigors.  She reports that she was in her normal state of health 


until this morning when the symptoms developed.  She denies n/v/


constipation/SOB/CP.  She was in the hospital from 10/27-10/31 for a UTI and 


skin breakdown in her perineum secondary to incontinence.  She reports that she 


doesn't chronically have a Le catheter.  





In the ER, she received LR 2400 cc for concern of sepsis, morphine, Rocephin and


Vancomycin.





History


Problems:  


(1) Obesity, morbid, BMI 50 or higher


Status:  Chronic


(2) ANXIETY DISORDER, UNSPECIFIED


Status:  Chronic


(3) Candidiasis of perineum


Status:  Acute


(4) History of kidney stones


Status:  Chronic


(5) HTN (hypertension)


Status:  Chronic


(6) CKD (chronic kidney disease) stage 3, GFR 30-59 ml/min


Status:  Chronic


Home Meds


Active Scripts


NYSTATIN [id number] UNT/ML Topical Cream (NYSTATIN [id number] UNT/ML Topical Cream) 15 


Gm Cream..g., 0 GM TP BID, #15 G


   Prov:RIP GARCIA DO         10/31/18


Promethazine Hcl (PROMETHAZINE HCL) 25 Mg Tablet, 25 MG PO Q8H PRN for 


NAUSEA/VOMITING, #20 TAB 0 Refills


   Prov:JAVY MARY MD         8/3/18


Nifedipine (PROCARDIA XL) 30 Mg Tab.er.24, 30 MG PO QDAY, #30 TAB


   Prov:RIP GARCIA DO         6/17/18


Reported Medications


Hydroxyzine Hcl (HYDROXYZINE HCL) 50 Mg Tablet, 50 MG PO TID PRN for ANXIETY


   10/18/18


Metoprolol Tartrate (Metoprolol Tartrate) 100 Mg Tablet, 100 MG PO BID, #60 0 


Refills


   5/18/11


Discontinued Reported Medications


Hydrocodone Bit/Acetaminophen (NORCO 7.5-325 TABLET) 1 Each Tablet, 1 EACH PO 


Q4-6H PRN for PAIN, #30


   10/18/18


Famotidine (FAMOTIDINE) 20 Mg Tablet, 20 MG PO BID, #20 TAB


   10/18/18


Solifenacin Succinate (VESICARE) 10 Mg Tablet, 10 MG PO QDAY


   10/15/18


Phenazopyridine Hcl (PHENAZOPYRIDINE HCL) 200 Mg Tablet, 200 MG PO TID PRN for 


BURNING WITH URINATION, TAB


   8/27/18


Discontinued Scripts


Valacyclovir Hcl (VALTREX) 1,000 Mg Tablet, 1000 MG PO TID, #20 TAB


   Prov:CHLOE BAKER FNP         10/26/18


Allergies:  


Coded Allergies:  


     latex (Verified  Allergy, Severe, RASH, 12/4/18)


   


   PEELS SKIN OFF


     Sulfa (Sulfonamide Antibiotics) (Verified  Allergy, Unknown, HIVES, 


12/4/18)


Hx Smoking:  No


Smoking Status:  Never Smoker


Caffeine Intake:  Coffee


Caffeine/Cups Per Day:  2 CUPS A DAY


Hx Alcohol Use:  No


Hx Substance Use Disorder:  No


Social Drug Use:  Never





Review of Systems


All Systems Reviewed/Normal:  Yes, Except as Noted





Exam


Vital Signs





Vital Signs








  Date Time  Temp Pulse Resp B/P (MAP) Pulse Ox O2 Delivery O2 Flow Rate FiO2


 


12/4/18 17:45  88   93 Nasal Cannula 3 


 


12/4/18 17:30    170/91 (117)    


 


12/4/18 15:30 99.8       


 


12/4/18 14:55   24     








General Appearance:  Alert, Awake, Other (She is noticably having chills with 


shaking, pale.  Breathing comfortably.)


Neuro:  No Gross deficits


Eyes:  PERRLA


ENT:  Moist Mucous Membranes


Cardiovascular:  Regular Rate and Rhythm


Respiratory:  Clear to Auscultation


GI:  Abd Soft and Non-Tender


:  Other (Mild left flank pain with palpation)


Extremities:  Edema (Trace pitting in shins bilaterally)


Integumentary:  No Jaundice, No Cyanosis





Medical Decision Making


Data Points


Result Diagram:  


12/4/18 1500                                                                    


           12/4/18 1500














Item Value  Date Time


 


Neutrophils (%) (Auto) 80.8 % H 12/4/18 1500


 


Lymphocytes (%) (Auto) 10.6 % L 12/4/18 1500


 


Monocytes (%) (Auto) 5.9 % 12/4/18 1500


 


Eosinophils (%) (Auto) 2.0 % 12/4/18 1500


 


Basophils (%) (Auto) 0.7 % 12/4/18 1500


 


Nucleated RBC Relative Count (auto) 0.1 /100WBC 12/4/18 1500


 


Prothromb Time International Ratio 0.95 12/4/18 1500


 


Carbon Dioxide Level 19 mmol/L L 12/4/18 1500


 


Blood Urea Nitrogen 21 mg/dl H 12/4/18 1500


 


Creatinine 1.40 mg/dl H 12/4/18 1500


 


Creatinine 1.40 mg/dl H 10/30/18 0538


 


Blood Urea Nitrogen 18 mg/dl 10/30/18 0538


 


Carbon Dioxide Level 22 mmol/L 10/30/18 0538


 


Creatinine 1.30 mg/dl H 10/29/18 0541


 


Lactate 1.6 mmol/L 12/4/18 1502


 


Troponin I < 0.012 ng/ml 12/4/18 1500


 


Total Bilirubin 0.5 mg/dl 12/4/18 1500


 


Aspartate Amino Transf (AST/SGOT) 34 U/L 12/4/18 1500


 


Alanine Aminotransferase (ALT/SGPT) 30 U/L 12/4/18 1500


 


Alkaline Phosphatase 103 U/L 12/4/18 1500


 


Urine RBC 9 /HPF 12/4/18 1533


 


Urine  /HPF 12/4/18 1533


 


Urine Leukocyte Esterase Large H 12/4/18 1533


 


Urine Nitrite Positive H 12/4/18 1533


 


Urine WBC Clumps Many /HPF 12/4/18 1533


 


Urine Squamous Epithelial Cells Many /LPF H 12/4/18 1533


 


Urine Bacteria Moderate /HPF H 12/4/18 1533











Assessment and Plan


Problems:  


(1) Urinary tract infection


Status:  Acute


Assessment & Plan:  She presented with rigors, left flank pain, fever to100.9 


and pyuria.  She has a h/o UTI but also pyelonephritis complicated by 


obstructing stones.  Lactate, BP, and P are normal.  She received a dose of 


vancomycin in the ER, so will check a random level tomorrow morning.  She will 


get Rocephin 2g IV [id number].  A renal US has been ordered to look for 


hydronephrosis that would be concerning for an obstructing stone.  Her abdominal


exam is benign.





(2) History of kidney stones


Status:  Chronic


Assessment & Plan:  Followed by Dr. Lin.  See above.





(3) CKD (chronic kidney disease) stage 3, GFR 30-59 ml/min


Status:  Chronic


Assessment & Plan:  Baseline creatinine is 1.2-1.5.  Will follow.





(4) Edema


Status:  Acute


Assessment & Plan:  She reports that she slept in chair for a while, but just 


got a new bed about a week ago.  The edema is significantly improving, but still


present.  Will follow.





(5) ANXIETY DISORDER, UNSPECIFIED


Status:  Chronic


Assessment & Plan:  Continue chronic prn hydroxyzine.





(6) HTN (hypertension)


Status:  Chronic


Assessment & Plan:  Continue chronic nifedipine and metoprolol with parameters.





(7) Obesity, morbid, BMI 50 or higher


Status:  Chronic


Copies to:   AIMEE LIN MD; HIEU FERNANDO DO ;





Venous Thromboembolism


Antithrombotics


Is Pt On Any Antithrombotics?:  No





Exam


Sepsis Risk:  No Definite Risk











RIKA VEGA MD                Dec 4, 2018 18:26

## 2018-12-05 VITALS — DIASTOLIC BLOOD PRESSURE: 85 MMHG | SYSTOLIC BLOOD PRESSURE: 171 MMHG

## 2018-12-05 VITALS — SYSTOLIC BLOOD PRESSURE: 171 MMHG | DIASTOLIC BLOOD PRESSURE: 93 MMHG

## 2018-12-05 VITALS — SYSTOLIC BLOOD PRESSURE: 160 MMHG | DIASTOLIC BLOOD PRESSURE: 85 MMHG

## 2018-12-05 VITALS — SYSTOLIC BLOOD PRESSURE: 164 MMHG | DIASTOLIC BLOOD PRESSURE: 94 MMHG

## 2018-12-05 VITALS — DIASTOLIC BLOOD PRESSURE: 78 MMHG | SYSTOLIC BLOOD PRESSURE: 160 MMHG

## 2018-12-05 VITALS — SYSTOLIC BLOOD PRESSURE: 150 MMHG | DIASTOLIC BLOOD PRESSURE: 79 MMHG

## 2018-12-05 LAB — PLATELET COUNT, AUTOMATED: 165 K/UL (ref 150–450)

## 2018-12-05 RX ADMIN — GABAPENTIN SCH MG: 300 CAPSULE ORAL at 09:19

## 2018-12-05 RX ADMIN — GABAPENTIN SCH MG: 300 CAPSULE ORAL at 19:50

## 2018-12-05 RX ADMIN — NIFEDIPINE SCH MG: 30 TABLET, FILM COATED, EXTENDED RELEASE ORAL at 09:20

## 2018-12-05 RX ADMIN — NYSTATIN SCH GM: 100000 POWDER TOPICAL at 10:38

## 2018-12-05 RX ADMIN — HYDROCODONE BITARTRATE AND ACETAMINOPHEN PRN EACH: 5; 325 TABLET ORAL at 19:50

## 2018-12-05 RX ADMIN — HYDROCODONE BITARTRATE AND ACETAMINOPHEN PRN EACH: 5; 325 TABLET ORAL at 07:57

## 2018-12-05 RX ADMIN — HYDROCODONE BITARTRATE AND ACETAMINOPHEN PRN EACH: 5; 325 TABLET ORAL at 14:09

## 2018-12-05 RX ADMIN — ENOXAPARIN SODIUM SCH MG: 30 INJECTION SUBCUTANEOUS at 09:19

## 2018-12-05 RX ADMIN — METOPROLOL TARTRATE SCH MG: 50 TABLET, FILM COATED ORAL at 09:21

## 2018-12-05 RX ADMIN — GABAPENTIN SCH MG: 300 CAPSULE ORAL at 14:09

## 2018-12-05 RX ADMIN — LIDOCAINE SCH EACH: 50 PATCH TOPICAL at 15:59

## 2018-12-05 RX ADMIN — HYDROCODONE BITARTRATE AND ACETAMINOPHEN PRN EACH: 5; 325 TABLET ORAL at 00:04

## 2018-12-05 RX ADMIN — NYSTATIN SCH APP: 100000 POWDER TOPICAL at 19:52

## 2018-12-05 RX ADMIN — METOPROLOL TARTRATE SCH MG: 50 TABLET, FILM COATED ORAL at 19:50

## 2018-12-05 NOTE — HOSPITALIST PROGRESS NOTE
Subjective


Progress Notes


Subjective


Overall, she reports feeling improved, but still has significant right lower 


extremity pain.





Physical Exam





Vital Signs








  Date Time  Temp Pulse Resp B/P (MAP) Pulse Ox O2 Delivery O2 Flow Rate FiO2


 


12/5/18 10:45 98.8 61 20 160/85 (110) 92 Nasal Cannula 1.0 














Intake and Output 


 


 12/5/18





 06:58


 


Intake Total 2900 ml


 


Output Total 1550 ml


 


Balance 1350 ml


 


 


 


Intake Oral 300 ml


 


IV Total 2600 ml


 


Output Urine Total 1550 ml








General Appearance:  Alert, Awake


Cardiovascular:  Regular Rate and Rhythm


Respiratory:  Clear to Auscultation


GI:  Soft and Non-Tender (obese)


Musculoskeletal:  Other (Pain with ROM/straight leg raise on right. No knee 


effusion/callor/erythema noted.)


Extremities:  Warm, Perfused, Other (No erythema noted.)


Integumentary:  Other (candidal changes in all skin folds)


Psych:  Alert & Oriented X3


Result Diagram:  


12/5/18 0517 12/5/18 0517








Assessment and Plan


Problems:  


(1) Urinary tract infection


Status:  Acute


Assessment & Plan:  Improved clinically. She presented with rigors, left flank 


pain, fever to 100.9F and pyuria.  She has a history of UTI with pyelonephritis 


complicated by obstructing stones. Will continue IV vancomycin and Rocephin.  A 


renal US did not show any hydronephrosis/stones/obstruction.  Await urine 


culture.





(2) History of kidney stones


Status:  Chronic


Assessment & Plan:  Followed by Dr. Mina.  See above.





(3) CKD (chronic kidney disease) stage 3, GFR 30-59 ml/min


Status:  Chronic


Assessment & Plan:  Stable. Baseline creatinine is 1.2-1.5 (1.4 today).  Will 


follow.





(4) Edema


Status:  Acute


Assessment & Plan:  She reports that she slept in chair for a while, but just 


got a new bed about a week ago.  The edema is significantly improved.  Will 


follow.





(5) ANXIETY DISORDER, UNSPECIFIED


Status:  Chronic


Assessment & Plan:  Continue chronic prn hydroxyzine.





(6) HTN (hypertension)


Status:  Chronic


Assessment & Plan:  Continue chronic nifedipine and metoprolol with parameters.





(7) Obesity, morbid, BMI 50 or higher


Status:  Chronic


(8) Candidiasis


Status:  Chronic


Assessment & Plan:  Will use some topical nystatin. Try to keep dry.





(9) Lower extremity pain


Status:  Chronic


Assessment & Plan:  She has had problems with this for quite awhile. Will check 


L-spine films.








Exam


Sepsis Risk:  No Definite Risk











KEYLA VAIL MD             Dec 5, 2018 14:44

## 2018-12-05 NOTE — ANTIMICROBIAL STEWARDSHIP
Antimicrobial Stewardship


Empiricly appropriate:  Yes


Significant PMH:  Yes


Support empiric regimen:  Yes


Comment


Hx of Pyelonephritis with obstructing stones.  Prior bacteria include:  e. 


faecium, enterobacter cloacae, Proteus-->All sensitive to Ceftriaxone and 


Vancomycin added for enterococcal coverage


Approriate Cultures done:  Yes


Gram stain show Microbs:  Yes (12/4/18:  Urine Cx- >100,000cfu GNR, Blood Cx x 2


pending (NGTD))


Renal/Hepatic dosing:  Yes (CKD, CrCl ~50 mL/min- Vancomycin dosed Q24h)


Serum concentration checked:  Yes (12/5/18- Vancomycin Random = 17.97 (after one


dose 12h later))


Determine cumulative duration:  UTI/Pyelonephritis- day 2 of antibiotics


Determine standard duration:  UTI/Pyelonephritis - 10-14 days


Comment


71 yo F who presented to the ED with fever, rigors, L flank pain, and pyuria.  


H/O pyelonephritis with obstructing kidney stones, and CKD.  Hx of ureteral 


stents removed on 10/31/18.





12/4/18:  Blood Cx x 2 - NGTD


12/4/18:  Urine Cx - >100,000cfu - GNR





Hx--> has grown e. faecium, p. mirabilis, enterobacter cloacae -- all previously


sensitive to ceftriaxone





12/4/18:  Ceftriaxone 2g IV Q24H


12/4/18:  Vancomycin 2.5g IV x 1, then 2g IV Q24H





CrCl ~50mL/min 





12/5/18:  0500 Random Vanco level = 17.27, redosed at 10 am to maintain goal 


trough of 15-20





Plan:  Continue Ceftriaxone 2g and Vancomycin 2g IV q24h until cultures come 


back.  Will follow and adjust as required.  





Sharee Calix, PharmD, BCOP











SHAREE CALIX                Dec 5, 2018 14:02

## 2018-12-05 NOTE — RADIOLOGY IMAGING REPORT
FACILITY: [facility] 

 

PATIENT NAME: Mila Maria

: 1945

MR: [id number]

V: [id number]

EXAM DATE: [id number]

ORDERING PHYSICIAN: KEYLA VAIL

TECHNOLOGIST: 

 

Location: [facility] [place]

Patient: Mila Maria

: 1945

MRN: [id number]

Visit/Account:[id number]

Date of Sevice: 2018

 

ACCESSION #: [id number]

 

Exam type: LUMBAR SPINE 4 VIEWS

 

History: back/R leg pain

 

Comparison: None.

 

Findings:

 

There are five nonrib-bearing lumbar-type vertebral bodies present.  There is mild disc space narrowi
ng at L3-4 with degenerative endplate changes and facet joint hypertrophy.  There are moderate to sev
ere degenerative endplate changes with disc space narrowing and facet joint hypertrophy at L4-5 and L
5-S1.  No evidence of acute fractures or subluxations.  Appropriate projects over the lower pelvis in
 the midline.  There are surgical clips in right upper quadrant of abdomen.

 

IMPRESSION:

 

1.  Spondylotic changes of the lumbar spine as described.  If patient's symptoms persist MR is recomm
ended

 

Report Dictated By: Hedy Anderson MD at 2018 1:57 PM

 

Report E-Signed By: Hedy Anderson MD  at 2018 1:58 PM

 

WSN:JOSE JUAN

## 2018-12-06 VITALS — SYSTOLIC BLOOD PRESSURE: 139 MMHG | DIASTOLIC BLOOD PRESSURE: 72 MMHG

## 2018-12-06 VITALS — SYSTOLIC BLOOD PRESSURE: 142 MMHG | DIASTOLIC BLOOD PRESSURE: 73 MMHG

## 2018-12-06 VITALS — DIASTOLIC BLOOD PRESSURE: 82 MMHG | SYSTOLIC BLOOD PRESSURE: 140 MMHG

## 2018-12-06 VITALS — SYSTOLIC BLOOD PRESSURE: 160 MMHG | DIASTOLIC BLOOD PRESSURE: 82 MMHG

## 2018-12-06 LAB — PLATELET COUNT, AUTOMATED: 147 K/UL (ref 150–450)

## 2018-12-06 RX ADMIN — NIFEDIPINE SCH MG: 30 TABLET, FILM COATED, EXTENDED RELEASE ORAL at 09:39

## 2018-12-06 RX ADMIN — DOCUSATE SODIUM SCH MG: 100 CAPSULE, LIQUID FILLED ORAL at 20:39

## 2018-12-06 RX ADMIN — NYSTATIN SCH APP: 100000 POWDER TOPICAL at 09:38

## 2018-12-06 RX ADMIN — OXYCODONE HYDROCHLORIDE PRN MG: 5 TABLET ORAL at 09:38

## 2018-12-06 RX ADMIN — METOPROLOL TARTRATE SCH MG: 50 TABLET, FILM COATED ORAL at 20:39

## 2018-12-06 RX ADMIN — GABAPENTIN SCH MG: 300 CAPSULE ORAL at 13:39

## 2018-12-06 RX ADMIN — OXYCODONE HYDROCHLORIDE PRN MG: 5 TABLET ORAL at 13:39

## 2018-12-06 RX ADMIN — HYDROCODONE BITARTRATE AND ACETAMINOPHEN PRN EACH: 5; 325 TABLET ORAL at 06:29

## 2018-12-06 RX ADMIN — ENOXAPARIN SODIUM SCH MG: 30 INJECTION SUBCUTANEOUS at 09:40

## 2018-12-06 RX ADMIN — METOPROLOL TARTRATE SCH MG: 50 TABLET, FILM COATED ORAL at 09:39

## 2018-12-06 RX ADMIN — HYDROCODONE BITARTRATE AND ACETAMINOPHEN PRN EACH: 5; 325 TABLET ORAL at 01:00

## 2018-12-06 RX ADMIN — OXYCODONE HYDROCHLORIDE PRN MG: 5 TABLET ORAL at 22:27

## 2018-12-06 RX ADMIN — OXYCODONE HYDROCHLORIDE PRN MG: 5 TABLET ORAL at 18:03

## 2018-12-06 RX ADMIN — NYSTATIN SCH APP: 100000 POWDER TOPICAL at 20:40

## 2018-12-06 RX ADMIN — GABAPENTIN SCH MG: 300 CAPSULE ORAL at 20:40

## 2018-12-06 RX ADMIN — GABAPENTIN SCH MG: 300 CAPSULE ORAL at 09:39

## 2018-12-06 RX ADMIN — LIDOCAINE SCH EACH: 50 PATCH TOPICAL at 09:37

## 2018-12-06 RX ADMIN — LIDOCAINE SCH EACH: 50 PATCH TOPICAL at 09:38

## 2018-12-06 NOTE — HOSPITALIST PROGRESS NOTE
Subjective


Progress Notes


Subjective


Left flank pain has resolved.  Lidoderm patch has helped with the back pain, but


still present.





Physical Exam





Vital Signs








  Date Time  Temp Pulse Resp B/P (MAP) Pulse Ox O2 Delivery O2 Flow Rate FiO2


 


12/6/18 08:03     92 Room Air  


 


12/6/18 07:49 98.2 65 16 140/82 (101)   2.0 














Intake and Output 


 


 12/6/18





 06:59


 


Intake Total 1840 ml


 


Output Total 2250 ml


 


Balance -410 ml


 


 


 


Intake Oral 580 ml


 


IV Total 1260 ml


 


Output Urine Total 2250 ml


 


# Bowel Movements 1








General Appearance:  Alert, Awake, No Acute Distress


GI:  Soft and Non-Tender


:  No CVA Tenderness


Extremities:  Edema (trace to 1+ pitting on shins)


Result Diagram:  


12/6/18 0505                                                                    


           12/6/18 0505








Assessment and Plan


Problems:  


(1) Urinary tract infection


Status:  Acute


Assessment & Plan:  She presented with rigors, left flank pain, fever to 100.9F 


and pyuria.  Improved clinically (afebrile and no more left flank pain). She has


a history of UTI with pyelonephritis complicated by obstructing stones. Will 


continue Rocephin and stop Vancomycin because urine is growing a GNR.  A renal 


US did not show any hydronephrosis/stones/obstruction.  Await urine culture.





(2) Lower extremity pain


Status:  Chronic


Assessment & Plan:  She has had problems with this for quite awhile. L-spine 


films c/w lumbar spondylotic changes.  Will get an MRI and potentially discuss 


with Dr. Cuellar based on the results.





(3) History of kidney stones


Status:  Chronic


Assessment & Plan:  Followed by Dr. Mina.  See above.





(4) CKD (chronic kidney disease) stage 3, GFR 30-59 ml/min


Status:  Chronic


Assessment & Plan:  Stable. Baseline creatinine is 1.2-1.5 (1.2 today).  Will 


follow.





(5) Edema


Status:  Acute


Assessment & Plan:  She reports that she slept in chair for a while, but just 


got a new bed about a week ago.  The edema is significantly improved.  Will fol


low.





(6) ANXIETY DISORDER, UNSPECIFIED


Status:  Chronic


Assessment & Plan:  Continue chronic prn hydroxyzine.





(7) HTN (hypertension)


Status:  Chronic


Assessment & Plan:  Continue chronic nifedipine and metoprolol with parameters.





(8) Candidiasis


Status:  Chronic


Assessment & Plan:  Will use some topical nystatin. Try to keep dry.





(9) Obesity, morbid, BMI 50 or higher


Status:  Chronic





Exam


Sepsis Risk:  No Definite Risk











RIKA VEGA MD                Dec 6, 2018 11:17

## 2018-12-06 NOTE — RADIOLOGY IMAGING REPORT
FACILITY: [facility] 

 

PATIENT NAME: Mila Maria

: 1945

MR: [id number]

V: [id number]

EXAM DATE: [id number]

ORDERING PHYSICIAN: RIKA VEGA

TECHNOLOGIST: 

 

Location: [facility]

Patient: Mila Maria

: 1945

MRN: [id number]

Visit/Account:[id number]

Date of Sevice: 2018

 

ACCESSION #: [id number]

 

EXAMINATION:

MRI lumbar spine without IV contrast

 

HISTORY:  Low back pain with radiation to the right leg.

 

COMPARISON:  Lumbar spine radiographs from 2018.

 

TECHNIQUE:  Multi-planar, multi-sequence lumbar spine MRI was performed without intravenous contrast 
administration.

 

FINDINGS:

The exam is mildly limited by patient motion artifact.

 

Alignment: Normal.

Vertebral marrow signal: Mild fatty degenerative endplate changes in the lower lumbar spine.

Distal thoracic cord: Negative.

Conus: negative, terminates at the mid L1 level.

Cauda equina: Negative.

Paravertebral soft tissues: Negative.

Visualized abdominal and pelvic structures: 1.4 cm simple left adnexal cyst partly visualized.

 

Disc spaces:

 

Lower thoracic spine: Small central disc protrusion at T9-10, and minimal disc bulge at T11-12.  No s
ignificant central canal or foraminal stenosis.

 

L1-2: Normal.

 

L2-3: Mild concentric disc bulge with bilateral facet hypertrophy and ligamentum flavum laxity.  Sara
lar fissure on the left.  Mild bilateral foraminal stenosis without significant central canal stenosi
s.

 

L3-4: Mild disc space narrowing and desiccation with a mild concentric disc bulge, bilateral facet hy
pertrophy and ligamentum flavum laxity.  There is a superimposed far right lateral disc protrusion.  
Mild right lateral recess stenosis and mild bilateral foraminal stenosis, right greater than left.  N
o significant central canal stenosis.

 

L4-5: Severe disc space narrowing with posterior bony spurring and a mild disc bulge eccentric to the
 right.  There is a small superimposed right paracentral disc extrusion with annular fissure and infe
rior extension.  Bilateral facet hypertrophy and ligamentum flavum laxity.  Mild-to-moderate central 
canal stenosis, mild right lateral recess stenosis, mild left and mild-to-moderate right foraminal st
enosis.

 

L5-S1: Severe disc space narrowing with posterior bony spurring and a broad-based disc protrusion ecc
entric to the right.  Bilateral facet hypertrophy.  There is moderate right lateral recess stenosis w
ith mild mass effect on the transiting right S1 nerve root.  Moderate bilateral foraminal stenosis.  
No significant central canal stenosis.

 

IMPRESSION:

 

1.  Multilevel degenerative disc disease and facet arthropathy is worst at L5-S1 where there is moder
ate right lateral recess stenosis with mild mass effect on the transiting right S1 nerve root.  Pleas
e see the findings for description of individual level disease.

 

2.  Annular fissures at L2-3 and L4-5.

 

Report Dictated By: Gladys Jimenez MD at 2018 3:52 PM

 

Report E-Signed By: Gladys Jimenez MD  at 2018 3:58 PM

 

WSN:[id number]

## 2018-12-07 VITALS — DIASTOLIC BLOOD PRESSURE: 75 MMHG | SYSTOLIC BLOOD PRESSURE: 145 MMHG

## 2018-12-07 VITALS — SYSTOLIC BLOOD PRESSURE: 159 MMHG | DIASTOLIC BLOOD PRESSURE: 88 MMHG

## 2018-12-07 VITALS — SYSTOLIC BLOOD PRESSURE: 139 MMHG | DIASTOLIC BLOOD PRESSURE: 88 MMHG

## 2018-12-07 VITALS — SYSTOLIC BLOOD PRESSURE: 134 MMHG | DIASTOLIC BLOOD PRESSURE: 70 MMHG

## 2018-12-07 VITALS — DIASTOLIC BLOOD PRESSURE: 75 MMHG | SYSTOLIC BLOOD PRESSURE: 123 MMHG

## 2018-12-07 RX ADMIN — ENOXAPARIN SODIUM SCH MG: 30 INJECTION SUBCUTANEOUS at 09:32

## 2018-12-07 RX ADMIN — OXYCODONE HYDROCHLORIDE PRN MG: 5 TABLET ORAL at 17:36

## 2018-12-07 RX ADMIN — CEPHALEXIN SCH MG: 500 CAPSULE ORAL at 17:36

## 2018-12-07 RX ADMIN — CEPHALEXIN SCH MG: 500 CAPSULE ORAL at 13:51

## 2018-12-07 RX ADMIN — METOPROLOL TARTRATE SCH MG: 50 TABLET, FILM COATED ORAL at 09:25

## 2018-12-07 RX ADMIN — METOPROLOL TARTRATE SCH MG: 50 TABLET, FILM COATED ORAL at 21:18

## 2018-12-07 RX ADMIN — GABAPENTIN SCH MG: 300 CAPSULE ORAL at 13:51

## 2018-12-07 RX ADMIN — NYSTATIN SCH APP: 100000 POWDER TOPICAL at 09:25

## 2018-12-07 RX ADMIN — GABAPENTIN SCH MG: 300 CAPSULE ORAL at 09:25

## 2018-12-07 RX ADMIN — NYSTATIN SCH APP: 100000 POWDER TOPICAL at 21:18

## 2018-12-07 RX ADMIN — OXYCODONE HYDROCHLORIDE PRN MG: 5 TABLET ORAL at 03:38

## 2018-12-07 RX ADMIN — CEPHALEXIN SCH MG: 500 CAPSULE ORAL at 21:20

## 2018-12-07 RX ADMIN — NIFEDIPINE SCH MG: 30 TABLET, FILM COATED, EXTENDED RELEASE ORAL at 09:25

## 2018-12-07 RX ADMIN — CEPHALEXIN SCH MG: 500 CAPSULE ORAL at 09:25

## 2018-12-07 RX ADMIN — GABAPENTIN SCH MG: 300 CAPSULE ORAL at 21:17

## 2018-12-07 RX ADMIN — DOCUSATE SODIUM SCH MG: 100 CAPSULE, LIQUID FILLED ORAL at 21:17

## 2018-12-07 RX ADMIN — OXYCODONE HYDROCHLORIDE PRN MG: 5 TABLET ORAL at 11:53

## 2018-12-07 RX ADMIN — POLYETHYLENE GLYCOL 3350 SCH GM: 17 POWDER, FOR SOLUTION ORAL at 09:25

## 2018-12-07 RX ADMIN — OXYCODONE HYDROCHLORIDE PRN MG: 5 TABLET ORAL at 21:17

## 2018-12-07 RX ADMIN — LIDOCAINE SCH EACH: 50 PATCH TOPICAL at 09:26

## 2018-12-07 RX ADMIN — DOCUSATE SODIUM SCH MG: 100 CAPSULE, LIQUID FILLED ORAL at 09:25

## 2018-12-07 RX ADMIN — OXYCODONE HYDROCHLORIDE PRN MG: 5 TABLET ORAL at 07:47

## 2018-12-07 NOTE — HOSPITALIST PROGRESS NOTE
Subjective


Progress Notes


Subjective


This patient was admitted for urinary infection.  She had no acute issues 


overnight.





Patient Complains of:


Cardiovascular:  No: Chest Pain


Respiratory:  No: Shortness of Breath





Physical Exam





Vital Signs








  Date Time  Temp Pulse Resp B/P (MAP) Pulse Ox O2 Delivery O2 Flow Rate FiO2


 


12/7/18 07:48     91 Nasal Cannula 1.0 


 


12/7/18 07:45 98.4 62 16 139/88 (105)    














Intake and Output 


 


 12/7/18





 06:59


 


Intake Total 700 ml


 


Output Total 2650 ml


 


Balance -1950 ml


 


 


 


Intake Oral 700 ml


 


Output Urine Total 2650 ml








Cardiovascular:  Regular Rate and Rhythm


Respiratory:  Clear to Auscultation


Result Diagram:  


12/6/18 0505                                                                    


           12/6/18 0505














Item Value  Date Time


 


Urine Culture - Final Complete 12/4/18 1533





Straight Cath Urine Escherichia Coli 











Assessment and Plan


Problems:  


(1) Urinary tract infection


Status:  Acute


Assessment & Plan:  She presented with rigors, left flank pain, and fever to 


100.9F.  Her urine culture is positive for E. coli.  She had been on treatment 


with ceftriaxone, but was converted to oral Keflex.  Pharmacy has recommended of


total of 14 days for treatment.





(2) Lower extremity pain


Status:  Chronic


Assessment & Plan:  She has had problems with this for quite awhile. An MRI has 


has impingement.  This was reviewed with Dr. Cuellar.  She will follow up with 


him as an outpatient.





(3) History of kidney stones


Status:  Chronic


Assessment & Plan:  She does have a history of obstructing stones.  Her 


ultrasound on this admission did not show any hydronephrosis.  She is followed 


by Dr. Mina.





(4) CKD (chronic kidney disease) stage 3, GFR 30-59 ml/min


Status:  Chronic


(5) Edema


Status:  Acute


Assessment & Plan:  Resolved.





(6) ANXIETY DISORDER, UNSPECIFIED


Status:  Chronic


Assessment & Plan:  Continue chronic prn hydroxyzine.





(7) HTN (hypertension)


Status:  Chronic


Assessment & Plan:  She is on chronic treatment with nifedipine and metoprolol.





(8) Candidiasis


Status:  Chronic


Assessment & Plan:  Will use some topical nystatin. Try to keep dry.





(9) Obesity, morbid, BMI 50 or higher


Status:  Chronic





Exam


Sepsis Risk:  No Definite Risk











RIP GARCIA DO                   Dec 7, 2018 09:27

## 2018-12-08 VITALS — SYSTOLIC BLOOD PRESSURE: 138 MMHG | DIASTOLIC BLOOD PRESSURE: 86 MMHG

## 2018-12-08 VITALS — SYSTOLIC BLOOD PRESSURE: 118 MMHG | DIASTOLIC BLOOD PRESSURE: 71 MMHG

## 2018-12-08 VITALS — SYSTOLIC BLOOD PRESSURE: 155 MMHG | DIASTOLIC BLOOD PRESSURE: 80 MMHG

## 2018-12-08 VITALS — SYSTOLIC BLOOD PRESSURE: 143 MMHG | DIASTOLIC BLOOD PRESSURE: 89 MMHG

## 2018-12-08 VITALS — DIASTOLIC BLOOD PRESSURE: 86 MMHG | SYSTOLIC BLOOD PRESSURE: 140 MMHG

## 2018-12-08 RX ADMIN — Medication PRN MG: at 10:22

## 2018-12-08 RX ADMIN — METOPROLOL TARTRATE SCH MG: 50 TABLET, FILM COATED ORAL at 08:45

## 2018-12-08 RX ADMIN — CEPHALEXIN SCH MG: 500 CAPSULE ORAL at 20:55

## 2018-12-08 RX ADMIN — OXYCODONE HYDROCHLORIDE PRN MG: 5 TABLET ORAL at 06:16

## 2018-12-08 RX ADMIN — DOCUSATE SODIUM SCH MG: 100 CAPSULE, LIQUID FILLED ORAL at 20:55

## 2018-12-08 RX ADMIN — OXYCODONE HYDROCHLORIDE PRN MG: 5 TABLET ORAL at 20:55

## 2018-12-08 RX ADMIN — Medication PRN GM: at 20:56

## 2018-12-08 RX ADMIN — CEPHALEXIN SCH MG: 500 CAPSULE ORAL at 08:45

## 2018-12-08 RX ADMIN — GABAPENTIN SCH MG: 300 CAPSULE ORAL at 20:54

## 2018-12-08 RX ADMIN — NIFEDIPINE SCH MG: 30 TABLET, FILM COATED, EXTENDED RELEASE ORAL at 08:44

## 2018-12-08 RX ADMIN — LIDOCAINE SCH EACH: 50 PATCH TOPICAL at 08:46

## 2018-12-08 RX ADMIN — GABAPENTIN SCH MG: 300 CAPSULE ORAL at 13:42

## 2018-12-08 RX ADMIN — METOPROLOL TARTRATE SCH MG: 50 TABLET, FILM COATED ORAL at 20:55

## 2018-12-08 RX ADMIN — OXYCODONE HYDROCHLORIDE PRN MG: 5 TABLET ORAL at 01:59

## 2018-12-08 RX ADMIN — Medication PRN GM: at 15:36

## 2018-12-08 RX ADMIN — CEPHALEXIN SCH MG: 500 CAPSULE ORAL at 13:42

## 2018-12-08 RX ADMIN — NYSTATIN SCH APP: 100000 POWDER TOPICAL at 08:47

## 2018-12-08 RX ADMIN — Medication PRN MG: at 20:55

## 2018-12-08 RX ADMIN — NYSTATIN SCH APP: 100000 POWDER TOPICAL at 20:56

## 2018-12-08 RX ADMIN — DOCUSATE SODIUM SCH MG: 100 CAPSULE, LIQUID FILLED ORAL at 08:44

## 2018-12-08 RX ADMIN — LIDOCAINE SCH EACH: 50 PATCH TOPICAL at 08:47

## 2018-12-08 RX ADMIN — OXYCODONE HYDROCHLORIDE PRN MG: 5 TABLET ORAL at 10:22

## 2018-12-08 RX ADMIN — POLYETHYLENE GLYCOL 3350 SCH GM: 17 POWDER, FOR SOLUTION ORAL at 08:45

## 2018-12-08 RX ADMIN — GABAPENTIN SCH MG: 300 CAPSULE ORAL at 08:45

## 2018-12-08 RX ADMIN — ENOXAPARIN SODIUM SCH MG: 30 INJECTION SUBCUTANEOUS at 08:46

## 2018-12-08 RX ADMIN — CEPHALEXIN SCH MG: 500 CAPSULE ORAL at 17:32

## 2018-12-08 RX ADMIN — OXYCODONE HYDROCHLORIDE PRN MG: 5 TABLET ORAL at 15:35

## 2018-12-08 NOTE — HOSPITALIST PROGRESS NOTE
Subjective


Progress Notes


Subjective


She reports continued discomfort in right lower extremity.





Physical Exam





Vital Signs








  Date Time  Temp Pulse Resp B/P (MAP) Pulse Ox O2 Delivery O2 Flow Rate FiO2


 


12/8/18 08:41 98.3 70 18 143/89 (107) 91 Nasal Cannula 1.0 














Intake and Output 


 


 12/8/18





 06:59


 


Intake Total 1700 ml


 


Output Total 675 ml


 


Balance 1025 ml


 


 


 


Intake Oral 1700 ml


 


Output Urine Total 675 ml


 


# Voids 4








General Appearance:  Alert, Awake


Cardiovascular:  Regular Rate and Rhythm


Respiratory:  Clear to Auscultation


GI:  Soft and Non-Tender (Obese)


Extremities:  Warm, Perfused


Psych:  Alert & Oriented X3


Result Diagram:  


12/6/18 0505                                                                    


           12/6/18 0505








Assessment and Plan


Problems:  


(1) Urinary tract infection


Status:  Acute


Assessment & Plan:  She presented with rigors, left flank pain, and fever to 


100.9F.  Her urine culture is positive for E. coli.  She had been on treatment 


with ceftriaxone, but was converted to oral Keflex.  Pharmacy has recommended of


total of 14 days for treatment.





(2) Lower extremity pain


Status:  Chronic


Assessment & Plan:  She has had problems with this for quite awhile. An MRI 


shows impingement on right at L5-S1.  This was reviewed with Dr. Cuellar - he has


recommended conservative management at this time follow up with him as an 


outpatient. They will consider possible injections as well.





(3) History of kidney stones


Status:  Chronic


Assessment & Plan:  She does have a history of obstructing stones.  Her 


ultrasound on this admission did not show any hydronephrosis.  She is followed 


by Dr. Mina.





(4) CKD (chronic kidney disease) stage 3, GFR 30-59 ml/min


Status:  Chronic


Assessment & Plan:  Creatinine 1.2 on 12/6/18, which is slightly better than her


baseline.





(5) Edema


Status:  Acute


Assessment & Plan:  Improved/Resolved.





(6) ANXIETY DISORDER, UNSPECIFIED


Status:  Chronic


Assessment & Plan:  Continue chronic prn hydroxyzine.





(7) HTN (hypertension)


Status:  Chronic


Assessment & Plan:  She is on chronic treatment with nifedipine and metoprolol.





(8) Candidiasis


Status:  Chronic


Assessment & Plan:  She is on topical nystatin. Try to keep dry.





(9) Obesity, morbid, BMI 50 or higher


Status:  Chronic





Exam


Sepsis Risk:  No Definite Risk











KEYLA VAIL MD             Dec 8, 2018 10:09

## 2018-12-09 VITALS — DIASTOLIC BLOOD PRESSURE: 97 MMHG | SYSTOLIC BLOOD PRESSURE: 137 MMHG

## 2018-12-09 VITALS — SYSTOLIC BLOOD PRESSURE: 133 MMHG | DIASTOLIC BLOOD PRESSURE: 76 MMHG

## 2018-12-09 VITALS — SYSTOLIC BLOOD PRESSURE: 140 MMHG | DIASTOLIC BLOOD PRESSURE: 81 MMHG

## 2018-12-09 VITALS — SYSTOLIC BLOOD PRESSURE: 147 MMHG | DIASTOLIC BLOOD PRESSURE: 79 MMHG

## 2018-12-09 VITALS — DIASTOLIC BLOOD PRESSURE: 94 MMHG | SYSTOLIC BLOOD PRESSURE: 163 MMHG

## 2018-12-09 VITALS — DIASTOLIC BLOOD PRESSURE: 78 MMHG | SYSTOLIC BLOOD PRESSURE: 140 MMHG

## 2018-12-09 LAB — PLATELET COUNT, AUTOMATED: 190 K/UL (ref 150–450)

## 2018-12-09 RX ADMIN — POLYETHYLENE GLYCOL 3350 SCH GM: 17 POWDER, FOR SOLUTION ORAL at 08:46

## 2018-12-09 RX ADMIN — DOCUSATE SODIUM SCH MG: 100 CAPSULE, LIQUID FILLED ORAL at 21:20

## 2018-12-09 RX ADMIN — Medication PRN APP: at 21:26

## 2018-12-09 RX ADMIN — GABAPENTIN SCH MG: 300 CAPSULE ORAL at 21:21

## 2018-12-09 RX ADMIN — OXYCODONE HYDROCHLORIDE PRN MG: 5 TABLET ORAL at 06:08

## 2018-12-09 RX ADMIN — METOPROLOL TARTRATE SCH MG: 50 TABLET, FILM COATED ORAL at 21:21

## 2018-12-09 RX ADMIN — Medication PRN MG: at 14:24

## 2018-12-09 RX ADMIN — Medication PRN GM: at 14:28

## 2018-12-09 RX ADMIN — NIFEDIPINE SCH MG: 30 TABLET, FILM COATED, EXTENDED RELEASE ORAL at 08:44

## 2018-12-09 RX ADMIN — GABAPENTIN SCH MG: 300 CAPSULE ORAL at 08:45

## 2018-12-09 RX ADMIN — LIDOCAINE SCH EACH: 50 PATCH TOPICAL at 08:46

## 2018-12-09 RX ADMIN — NYSTATIN SCH APP: 100000 POWDER TOPICAL at 20:09

## 2018-12-09 RX ADMIN — OXYCODONE HYDROCHLORIDE PRN MG: 5 TABLET ORAL at 02:06

## 2018-12-09 RX ADMIN — CEPHALEXIN SCH MG: 500 CAPSULE ORAL at 08:45

## 2018-12-09 RX ADMIN — METOPROLOL TARTRATE SCH MG: 50 TABLET, FILM COATED ORAL at 08:44

## 2018-12-09 RX ADMIN — Medication PRN MG: at 06:09

## 2018-12-09 RX ADMIN — OXYCODONE HYDROCHLORIDE PRN MG: 5 TABLET ORAL at 10:52

## 2018-12-09 RX ADMIN — ENOXAPARIN SODIUM SCH MG: 30 INJECTION SUBCUTANEOUS at 08:44

## 2018-12-09 RX ADMIN — OXYCODONE HYDROCHLORIDE PRN MG: 5 TABLET ORAL at 15:56

## 2018-12-09 RX ADMIN — NYSTATIN SCH APP: 100000 POWDER TOPICAL at 08:45

## 2018-12-09 RX ADMIN — GABAPENTIN SCH MG: 300 CAPSULE ORAL at 14:24

## 2018-12-09 RX ADMIN — CEPHALEXIN SCH MG: 500 CAPSULE ORAL at 21:21

## 2018-12-09 RX ADMIN — OXYCODONE HYDROCHLORIDE PRN MG: 5 TABLET ORAL at 21:21

## 2018-12-09 RX ADMIN — CEPHALEXIN SCH MG: 500 CAPSULE ORAL at 14:24

## 2018-12-09 RX ADMIN — CEPHALEXIN SCH MG: 500 CAPSULE ORAL at 15:55

## 2018-12-09 RX ADMIN — DOCUSATE SODIUM SCH MG: 100 CAPSULE, LIQUID FILLED ORAL at 08:45

## 2018-12-09 NOTE — HOSPITALIST PROGRESS NOTE
Subjective


Progress Notes


Subjective


She reports that her pain in back and right leg are much improved.  however, she


still isn't able to transfer or ambulate





Physical Exam





Vital Signs








  Date Time  Temp Pulse Resp B/P (MAP) Pulse Ox O2 Delivery O2 Flow Rate FiO2


 


12/9/18 08:50 98.1       


 


12/9/18 08:39  60  140/81 (100) 90 Nasal Cannula 1.0 


 


12/9/18 02:20   16     














Intake and Output 


 


 12/9/18





 06:59


 


Intake Total 1720 ml


 


Balance 1720 ml


 


 


 


Intake Oral 1720 ml


 


# Voids 6


 


# Bowel Movements 1








General Appearance:  Alert, Awake, No Acute Distress


Result Diagram:  


12/9/18 0523 12/9/18 0523








Assessment and Plan


Problems:  


(1) Urinary tract infection


Status:  Acute


Assessment & Plan:  She presented with rigors, left flank pain, and fever to 


100.9F.  Her urine culture is positive for E. coli.  She had been on treatment 


with ceftriaxone, but was converted to oral Keflex.  Pharmacy has recommended of


total of 14 days for treatment.





(2) Lower extremity pain


Status:  Chronic


Assessment & Plan:  She has had problems with this for quite awhile. An MRI 


shows impingement on right at L5-S1.  This was reviewed with Dr. Cuellar - she 


doesn't want any surgical intervention at this time, so he has recommended 


follow up with Dr. Quevedo as an outpatient for possible injections.





(3) History of kidney stones


Status:  Chronic


Assessment & Plan:  She does have a history of obstructing stones.  Her 


ultrasound on this admission did not show any hydronephrosis.  She is followed 


by Dr. Mina.  She is to followup with him as an outpatient to discuss chronic 


suppressive antibiotics.





(4) CKD (chronic kidney disease) stage 3, GFR 30-59 ml/min


Status:  Chronic


Assessment & Plan:  Stable. Baseline creatinine is 1.2-1.5 (1.3 today).  Will 


follow.





(5) Edema


Status:  Acute


Assessment & Plan:  Improved/Resolved.





(6) ANXIETY DISORDER, UNSPECIFIED


Status:  Chronic


Assessment & Plan:  Continue chronic prn hydroxyzine.





(7) HTN (hypertension)


Status:  Chronic


Assessment & Plan:  She is on chronic treatment with nifedipine and metoprolol.





(8) Candidiasis


Status:  Chronic


Assessment & Plan:  She is on topical nystatin. Try to keep dry.





(9) Obesity, morbid, BMI 50 or higher


Status:  Chronic





Exam


Sepsis Risk:  No Definite Risk











RIKA VEGA MD                Dec 9, 2018 10:55

## 2018-12-10 VITALS — DIASTOLIC BLOOD PRESSURE: 89 MMHG | SYSTOLIC BLOOD PRESSURE: 156 MMHG

## 2018-12-10 VITALS — SYSTOLIC BLOOD PRESSURE: 149 MMHG | DIASTOLIC BLOOD PRESSURE: 81 MMHG

## 2018-12-10 VITALS — DIASTOLIC BLOOD PRESSURE: 103 MMHG | SYSTOLIC BLOOD PRESSURE: 155 MMHG

## 2018-12-10 VITALS — DIASTOLIC BLOOD PRESSURE: 82 MMHG | SYSTOLIC BLOOD PRESSURE: 166 MMHG

## 2018-12-10 RX ADMIN — NYSTATIN SCH APP: 100000 POWDER TOPICAL at 09:09

## 2018-12-10 RX ADMIN — OXYCODONE HYDROCHLORIDE PRN MG: 5 TABLET ORAL at 04:43

## 2018-12-10 RX ADMIN — Medication PRN APP: at 08:00

## 2018-12-10 RX ADMIN — CEPHALEXIN SCH MG: 500 CAPSULE ORAL at 13:14

## 2018-12-10 RX ADMIN — NIFEDIPINE SCH MG: 30 TABLET, FILM COATED, EXTENDED RELEASE ORAL at 09:08

## 2018-12-10 RX ADMIN — METOPROLOL TARTRATE SCH MG: 50 TABLET, FILM COATED ORAL at 09:08

## 2018-12-10 RX ADMIN — GABAPENTIN SCH MG: 300 CAPSULE ORAL at 09:08

## 2018-12-10 RX ADMIN — ENOXAPARIN SODIUM SCH MG: 30 INJECTION SUBCUTANEOUS at 09:10

## 2018-12-10 RX ADMIN — LIDOCAINE SCH EACH: 50 PATCH TOPICAL at 09:11

## 2018-12-10 RX ADMIN — OXYCODONE HYDROCHLORIDE PRN MG: 5 TABLET ORAL at 17:22

## 2018-12-10 RX ADMIN — GABAPENTIN SCH MG: 300 CAPSULE ORAL at 13:17

## 2018-12-10 RX ADMIN — DOCUSATE SODIUM SCH MG: 100 CAPSULE, LIQUID FILLED ORAL at 09:07

## 2018-12-10 RX ADMIN — CEPHALEXIN SCH MG: 500 CAPSULE ORAL at 17:22

## 2018-12-10 RX ADMIN — OXYCODONE HYDROCHLORIDE PRN MG: 5 TABLET ORAL at 13:17

## 2018-12-10 RX ADMIN — POLYETHYLENE GLYCOL 3350 SCH GM: 17 POWDER, FOR SOLUTION ORAL at 09:09

## 2018-12-10 RX ADMIN — LIDOCAINE SCH EACH: 50 PATCH TOPICAL at 09:10

## 2018-12-10 RX ADMIN — CEPHALEXIN SCH MG: 500 CAPSULE ORAL at 09:08

## 2018-12-10 RX ADMIN — OXYCODONE HYDROCHLORIDE PRN MG: 5 TABLET ORAL at 09:09

## 2018-12-10 RX ADMIN — Medication PRN MG: at 08:07

## 2018-12-10 NOTE — HOSPITALIST DEPART
Discharge Summary


Reason for Hosp/Final Diag:  


(1) Urinary tract infection


Status:  Acute


Hospital Course & Plan:  She presented with rigors, left flank pain, and fever 


to 100.9F.  Her urine culture is positive for E. coli.  She had been on 


treatment with ceftriaxone, but was converted to oral Keflex.  Pharmacy has 


recommended of total of 14 days for treatment. She will continue another 7 days 


of Keflex. She would like to see Dr. Santoro about recurring UTI's. She will make


an appointment as an outpatient. 





(2) Weakness generalized


Status:  Acute


Hospital Course & Plan:  She presented with weakness and has been working with 


therapy throughout admission.  PT and OT have recommended further rehab prior to


discharge home, however the patient is adamant that she will be discharged home.


She has been accepted to both the [facility] and [facility], and has refused both these options. She would like to go home with 


home health. Patient reminded she has nurses helping her get in and out of bed 


and to the bathroom, and patient responds with "God has healed me". She states 


she will have her granddaughter stay with her after discharge. I have discussed 


with patient at length, that I do not agree with her going home until she recei


ves further rehab since she lives alone. At this time, patient refuses any 


further care. She will be discharged home. 





(3) Lower extremity pain


Status:  Chronic


Hospital Course & Plan:  She has had problems with this for quite awhile. An MRI


shows impingement on right at L5-S1.  This was reviewed with Dr. Cuellar - she 


doesn't want any surgical intervention at this time, so he has recommended 


follow up with Dr. Quintero as an outpatient for possible injections. 





(4) History of kidney stones


Status:  Chronic


Hospital Course & Plan:  She does have a history of obstructing stones.  Her 


ultrasound on this admission did not show any hydronephrosis.  She prefers to 


follow up with Dr. Santoro. She is to followup with him as an outpatient to 


discuss chronic suppressive antibiotics.





(5) CKD (chronic kidney disease) stage 3, GFR 30-59 ml/min


Status:  Chronic


Hospital Course & Plan:  Stable. Baseline creatinine is 1.2-1.5 (1.3 today).





(6) Edema


Status:  Acute


Hospital Course & Plan:  Improved/Resolved.





(7) ANXIETY DISORDER, UNSPECIFIED


Status:  Chronic


Hospital Course & Plan:  Continue chronic prn hydroxyzine.





(8) HTN (hypertension)


Status:  Chronic


Hospital Course & Plan:  She is on chronic treatment with nifedipine and 


metoprolol.





(9) Candidiasis


Status:  Chronic


Hospital Course & Plan:  She is on topical nystatin. Try to keep dry.





(10) Obesity, morbid, BMI 50 or higher


Status:  Chronic


Departure


Latest Vital Signs





Vital Signs








 12/10/18 12/10/18





 07:48 13:57


 


Temp  98.2


 


Pulse  61


 


Resp  16


 


B/P (MAP)  149/81 (103)


 


Pulse Ox  91


 


O2 Delivery  Room Air


 


O2 Flow Rate 1.5 








Weight (Pounds):  300


Weight (Ounces):  7.0


Result Diagram:  


12/9/18 0523 12/9/18 0523





Condition:  Improved


Discharge:  Home, Home Health


PT/OT Follow Up For:  PT For Strengthening, OT For ADL's, PT Evaluation and 


Treat, OT Evaluation and Treat


Home Health RN Follow Up For:  Nursing Assessment


Home Health CNA Follow Up For:  ADL Assistance





Discharge Instructions


Home Meds


Active Scripts


Gabapentin (GABAPENTIN) 400 Mg Capsule, 400 MG PO TID, #90 CAPSULE


   Prov:PAU HOWELL [facility]         12/10/18


Menthol/Methyl Salicylate (BENGAY GREASELESS CREAM) 57 Gm Oint, 0 GM TP 3-4XD 


PRN for pain, #1 TUBE


   Prov:HOWELLPAU [place] [facility]         12/10/18


Oxycodone Hcl (OXYCODONE HCL) 5 Mg Tablet, 5 MG PO Q4H PRN for PAIN, #42 TAB


   Prov:DANTEPAU [place] [facility]         12/10/18


Lidocaine (Lidocaine) 5 % Adh..patch, 1 EACH TP QDAY for 30 Days, #60 PATCH


   apply one patch to low back, one patch to thigh


   Prov:PAU HOWELL [facility]         12/10/18


Cephalexin Monohydrate (CEPHALEXIN) 500 Mg Cap, 500 MG PO QID, #28 CAP


   Prov:HOWELLGUILLAUMEPAU [place] [facility]         12/10/18


NYSTATIN [id number] UNT/ML Topical Cream (NYSTATIN 442042 UNT/ML Topical Cream) 15 


Gm Cream..g., 0 GM TP BID, #15 G


   Prov:RIP GARCIA DO         10/31/18


Promethazine Hcl (PROMETHAZINE HCL) 25 Mg Tablet, 25 MG PO Q8H PRN for 


NAUSEA/VOMITING, #20 TAB 0 Refills


   Prov:JAVY MARY MD         8/3/18


Nifedipine (PROCARDIA XL) 30 Mg Tab.er.24, 30 MG PO QDAY, #30 TAB


   Prov:RIP GARCIA DO         6/17/18


Reported Medications


Hydroxyzine Hcl (HYDROXYZINE HCL) 50 Mg Tablet, 50 MG PO TID PRN for ANXIETY


   10/18/18


Metoprolol Tartrate (Metoprolol Tartrate) 100 Mg Tablet, 100 MG PO BID, #60 0 


Refills


   5/18/11


Discontinued Reported Medications


Gabapentin (GABAPENTIN) 300 Mg Capsule, 300 MG PO TID, CAPSULE


   12/4/18


Hydrocodone Bit/Acetaminophen (NORCO 7.5-325 TABLET) 1 Each Tablet, 1 EACH PO 


Q4-6H PRN for PAIN, #30


   10/18/18


Famotidine (FAMOTIDINE) 20 Mg Tablet, 20 MG PO BID, #20 TAB


   10/18/18


Solifenacin Succinate (VESICARE) 10 Mg Tablet, 10 MG PO QDAY


   10/15/18


Phenazopyridine Hcl (PHENAZOPYRIDINE HCL) 200 Mg Tablet, 200 MG PO TID PRN for 


BURNING WITH URINATION, TAB


   8/27/18


Discontinued Scripts


Valacyclovir Hcl (VALTREX) 1,000 Mg Tablet, 1000 MG PO TID, #20 TAB


   Prov:OLIVIACHLOE FN         10/26/18


Diet:  Regular


Activity:  As Tolerated, With Walker


Special Instructions:  


Follow up with Dr. Quintero for back.


Follow up with Dr. Santoro regarding UTI's.


Copies to:   OLESYA QUINTERO MD; HIEU FERNANDO DO; EFREN SANTORO MD ;





Venous Thromboembolism


Antithrombotics


Is Pt On Any Antithrombotics?:  No











PAU HOWELL FN            Dec 10, 2018 15:12

## 2018-12-10 NOTE — HOSPITALIST PROGRESS NOTE
Subjective


Progress Notes


Subjective


She has no complaints this morning. She had no acute events overnight.





Patient Complains of:


Cardiovascular:  No: Chest Pain


Respiratory:  No: Shortness of Breath





Physical Exam





Vital Signs








  Date Time  Temp Pulse Resp B/P (MAP) Pulse Ox O2 Delivery O2 Flow Rate FiO2


 


12/10/18 07:48 98.3 56 16 166/82 (110) 90 Nasal Cannula 1.5 














Intake and Output 


 


 12/10/18





 00:00


 


Intake Total 870 ml


 


Balance 870 ml


 


 


 


Intake Oral 870 ml


 


# Voids 4








General Appearance:  Alert, Awake, No Acute Distress, Afebrile


Neuro:  No Gross deficits


Cardiovascular:  Regular Rate and Rhythm


Respiratory:  No Respiratory Distress, Clear to Auscultation


Psych:  Alert & Oriented X3, Appropriate Mood & Affect


Result Diagram:  


12/9/18 0523 12/9/18 0523








Assessment and Plan


Problems:  


(1) Urinary tract infection


Status:  Acute


Assessment & Plan:  She presented with rigors, left flank pain, and fever to 


100.9F.  Her urine culture is positive for E. coli.  She had been on treatment 


with ceftriaxone, but was converted to oral Keflex.  Pharmacy has recommended of


total of 14 days for treatment.  She is currently awaiting acceptance for SNF 


for continued rehab. 





(2) Lower extremity pain


Status:  Chronic


Assessment & Plan:  She has had problems with this for quite awhile. An MRI 


shows impingement on right at L5-S1.  This was reviewed with Dr. Cuellar - she 


doesn't want any surgical intervention at this time, so he has recommended 


follow up with Dr. Quevedo as an outpatient for possible injections.





(3) History of kidney stones


Status:  Chronic


Assessment & Plan:  She does have a history of obstructing stones.  Her 


ultrasound on this admission did not show any hydronephrosis.  She is followed 


by Dr. Mina.  She is to followup with him as an outpatient to discuss chronic 


suppressive antibiotics.





(4) CKD (chronic kidney disease) stage 3, GFR 30-59 ml/min


Status:  Chronic


Assessment & Plan:  Stable. Baseline creatinine is 1.2-1.5 (1.3 today).  Will 


follow.





(5) Edema


Status:  Acute


Assessment & Plan:  Improved/Resolved.





(6) ANXIETY DISORDER, UNSPECIFIED


Status:  Chronic


Assessment & Plan:  Continue chronic prn hydroxyzine.





(7) HTN (hypertension)


Status:  Chronic


Assessment & Plan:  She is on chronic treatment with nifedipine and metoprolol.





(8) Candidiasis


Status:  Chronic


Assessment & Plan:  She is on topical nystatin. Try to keep dry.





(9) Obesity, morbid, BMI 50 or higher


Status:  Chronic





Exam


Sepsis Risk:  No Definite Risk











PAU HOWELL            Dec 10, 2018 11:03